# Patient Record
Sex: FEMALE | Race: WHITE | Employment: UNEMPLOYED | URBAN - METROPOLITAN AREA
[De-identification: names, ages, dates, MRNs, and addresses within clinical notes are randomized per-mention and may not be internally consistent; named-entity substitution may affect disease eponyms.]

---
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
## 2023-11-02 NOTE — PAYOR COMM NOTE
--------------  DISCHARGE REVIEW    Payor: Clayton Berg #:  [id number]  Authorization Number: [id number]    Admit date: 8/20/19  Admit time:  1409  Discharge Date: 8/21/2019  3:00 PM     Admitting Physician: Zeb Hook weeks.     Complications: None    Consultants     Provider Role Specialty    Edd Lopez MD Consulting Physician  PEDIATRICS    Graciela Francis MD Consulting Physician  Maternal Fetal Medicine              Discharge Plan:   Discharge Condition: Stable done

## 2024-03-05 ENCOUNTER — TELEPHONE (OUTPATIENT)
Dept: OBGYN CLINIC | Facility: CLINIC | Age: 24
End: 2024-03-05

## 2024-03-05 ENCOUNTER — LAB ENCOUNTER (OUTPATIENT)
Dept: LAB | Age: 24
End: 2024-03-05
Attending: FAMILY MEDICINE
Payer: MEDICAID

## 2024-03-05 ENCOUNTER — OFFICE VISIT (OUTPATIENT)
Dept: FAMILY MEDICINE CLINIC | Facility: CLINIC | Age: 24
End: 2024-03-05

## 2024-03-05 VITALS
HEIGHT: 60 IN | WEIGHT: 114 LBS | SYSTOLIC BLOOD PRESSURE: 103 MMHG | RESPIRATION RATE: 18 BRPM | BODY MASS INDEX: 22.38 KG/M2 | DIASTOLIC BLOOD PRESSURE: 68 MMHG | HEART RATE: 84 BPM

## 2024-03-05 DIAGNOSIS — O09.30 LATE PRENATAL CARE (HCC): ICD-10-CM

## 2024-03-05 DIAGNOSIS — O09.299 HISTORY OF HELLP SYNDROME, CURRENTLY PREGNANT (HCC): ICD-10-CM

## 2024-03-05 DIAGNOSIS — O09.92 HIGH-RISK PREGNANCY IN SECOND TRIMESTER (HCC): Primary | ICD-10-CM

## 2024-03-05 DIAGNOSIS — R10.12 LEFT UPPER QUADRANT ABDOMINAL PAIN: ICD-10-CM

## 2024-03-05 DIAGNOSIS — O21.9 NAUSEA AND VOMITING DURING PREGNANCY (HCC): ICD-10-CM

## 2024-03-05 LAB
ALBUMIN SERPL-MCNC: 4.5 G/DL (ref 3.2–4.8)
ALBUMIN/GLOB SERPL: 1.7 {RATIO} (ref 1–2)
ALP LIVER SERPL-CCNC: 49 U/L
ALT SERPL-CCNC: 10 U/L
ANION GAP SERPL CALC-SCNC: 7 MMOL/L (ref 0–18)
AST SERPL-CCNC: 15 U/L (ref ?–34)
BASOPHILS # BLD AUTO: 0.05 X10(3) UL (ref 0–0.2)
BASOPHILS NFR BLD AUTO: 0.6 %
BILIRUB SERPL-MCNC: 0.7 MG/DL (ref 0.3–1.2)
BUN BLD-MCNC: 10 MG/DL (ref 9–23)
BUN/CREAT SERPL: 17.9 (ref 10–20)
CALCIUM BLD-MCNC: 9.6 MG/DL (ref 8.7–10.4)
CHLORIDE SERPL-SCNC: 107 MMOL/L (ref 98–112)
CO2 SERPL-SCNC: 26 MMOL/L (ref 21–32)
CONTROL LINE PRESENT WITH A CLEAR BACKGROUND (YES/NO): YES YES/NO
CREAT BLD-MCNC: 0.56 MG/DL
DEPRECATED RDW RBC AUTO: 39.9 FL (ref 35.1–46.3)
EGFRCR SERPLBLD CKD-EPI 2021: 132 ML/MIN/1.73M2 (ref 60–?)
EOSINOPHIL # BLD AUTO: 0.03 X10(3) UL (ref 0–0.7)
EOSINOPHIL NFR BLD AUTO: 0.4 %
ERYTHROCYTE [DISTWIDTH] IN BLOOD BY AUTOMATED COUNT: 12.4 % (ref 11–15)
FASTING STATUS PATIENT QL REPORTED: NO
GLOBULIN PLAS-MCNC: 2.7 G/DL (ref 2.8–4.4)
GLUCOSE BLD-MCNC: 65 MG/DL (ref 70–99)
HCT VFR BLD AUTO: 44.7 %
HGB BLD-MCNC: 14.9 G/DL
IMM GRANULOCYTES # BLD AUTO: 0.03 X10(3) UL (ref 0–1)
IMM GRANULOCYTES NFR BLD: 0.4 %
KIT LOT #: NORMAL NUMERIC
LYMPHOCYTES # BLD AUTO: 1.11 X10(3) UL (ref 1–4)
LYMPHOCYTES NFR BLD AUTO: 13.8 %
MCH RBC QN AUTO: 29.2 PG (ref 26–34)
MCHC RBC AUTO-ENTMCNC: 33.3 G/DL (ref 31–37)
MCV RBC AUTO: 87.6 FL
MONOCYTES # BLD AUTO: 0.46 X10(3) UL (ref 0.1–1)
MONOCYTES NFR BLD AUTO: 5.7 %
NEUTROPHILS # BLD AUTO: 6.39 X10 (3) UL (ref 1.5–7.7)
NEUTROPHILS # BLD AUTO: 6.39 X10(3) UL (ref 1.5–7.7)
NEUTROPHILS NFR BLD AUTO: 79.1 %
OSMOLALITY SERPL CALC.SUM OF ELEC: 287 MOSM/KG (ref 275–295)
PLATELET # BLD AUTO: 211 10(3)UL (ref 150–450)
POTASSIUM SERPL-SCNC: 4.1 MMOL/L (ref 3.5–5.1)
PREGNANCY TEST, URINE: POSITIVE
PROT SERPL-MCNC: 7.2 G/DL (ref 5.7–8.2)
RBC # BLD AUTO: 5.1 X10(6)UL
SODIUM SERPL-SCNC: 140 MMOL/L (ref 136–145)
WBC # BLD AUTO: 8.1 X10(3) UL (ref 4–11)

## 2024-03-05 PROCEDURE — 99204 OFFICE O/P NEW MOD 45 MIN: CPT | Performed by: FAMILY MEDICINE

## 2024-03-05 PROCEDURE — 80053 COMPREHEN METABOLIC PANEL: CPT

## 2024-03-05 PROCEDURE — 85025 COMPLETE CBC W/AUTO DIFF WBC: CPT

## 2024-03-05 PROCEDURE — 81025 URINE PREGNANCY TEST: CPT | Performed by: FAMILY MEDICINE

## 2024-03-05 PROCEDURE — 36415 COLL VENOUS BLD VENIPUNCTURE: CPT

## 2024-03-05 RX ORDER — PYRIDOXINE HCL (VITAMIN B6) 25 MG
25 TABLET ORAL DAILY
Qty: 30 TABLET | Refills: 0 | Status: SHIPPED | OUTPATIENT
Start: 2024-03-05

## 2024-03-05 NOTE — TELEPHONE ENCOUNTER
Patient verified name and     LMP 12/10/2023. Denies hx of miscarriage. Hx of regular cycles. Nurse education scheduled for 3/11/24. Aware of scheduling details.

## 2024-03-05 NOTE — PROGRESS NOTES
Nicky Mcleod is a 23 year old female.  HPI:   Patient is here to establish care, patient has prior history of help syndrome at the end of her prior pregnancy in 2019, patient reports regular menses, last menstrual period was on 12/10/2023, she reports that she has been having nausea and vomiting.  She also report that she has been experiencing left upper quadrant pain, that is persistent.    Current Outpatient Medications   Medication Sig Dispense Refill    Pyridoxine HCl 25 MG Oral Tab Take 1 tablet (25 mg total) by mouth daily. 30 tablet 0    docusate sodium 100 MG Oral Cap Take 100 mg by mouth 2 (two) times daily. (Patient not taking: Reported on 3/5/2024) 30 capsule 0    ibuprofen 600 MG Oral Tab Take 1 tablet (600 mg total) by mouth every 6 (six) hours as needed. (Patient not taking: Reported on 3/5/2024) 30 tablet 0    PRENATAL 27-0.8 MG Oral Tab Take 1 tablet by mouth daily. (Patient not taking: Reported on 3/5/2024)        Past Medical History:   Diagnosis Date    Amenorrhea     irregular      Social History:  Social History     Socioeconomic History    Marital status:      Spouse name: Uriah    Number of children: 0    Years of education: 12    Highest education level: High school graduate   Occupational History    Occupation: unemployed   Tobacco Use    Smoking status: Never    Smokeless tobacco: Never   Substance and Sexual Activity    Alcohol use: Never    Drug use: Never   Other Topics Concern    Caffeine Concern No    Exercise No    Seat Belt Yes    Special Diet No    Stress Concern No     Service No    Hobby Hazards No          EXAM:   /68   Pulse 84   Resp 18   Ht 5' (1.524 m)   Wt 114 lb (51.7 kg)   LMP 12/10/2023 (Exact Date)   BMI 22.26 kg/m²     Physical Exam  Vitals and nursing note reviewed.   Constitutional:       General: She is not in acute distress.  Pulmonary:      Effort: Pulmonary effort is normal.   Abdominal:      Tenderness: There is abdominal  tenderness in the left upper quadrant. There is no guarding.   Neurological:      Mental Status: She is alert and oriented to person, place, and time.   Psychiatric:         Mood and Affect: Mood normal.         Behavior: Behavior normal.            ASSESSMENT AND PLAN:   Patient with prior history of help syndrome that required induction of labor, positive pregnancy test in clinic, patient is presenting with normal vital signs but presenting with left upper quadrant pain, she is currently 12 weeks and 2 days of gestational age with an estimated due date of September 15, 2024.  Referral establish care with obstetrics provided, imaging as noted below, blood work to be completed as well    1. High-risk pregnancy in second trimester ([facility])    - POC Urine pregnancy test [17728]  - [facility] Referral - In Network    2. Late prenatal care ([facility])    - [facility] Referral - In Network    3. History of HELLP syndrome, currently pregnant ([facility])    - [facility] Referral - In Network  - CBC W Differential W Platelet [E]; Future  - Comp Metabolic Panel (14) [E]; Future    4. Left upper quadrant abdominal pain    - US ABDOMEN COMPLETE (CPT=76700); Future    5. Nausea and vomiting during pregnancy ([facility])    - Pyridoxine HCl 25 MG Oral Tab; Take 1 tablet (25 mg total) by mouth daily.  Dispense: 30 tablet; Refill: 0       The patient indicates understanding of these issues and agrees to the plan.      The above note was creating using Dragon speech recognition technology. Please excuse any typos.

## 2024-03-06 ENCOUNTER — TELEPHONE (OUTPATIENT)
Dept: FAMILY MEDICINE CLINIC | Facility: CLINIC | Age: 24
End: 2024-03-06

## 2024-03-06 NOTE — TELEPHONE ENCOUNTER
Left message for patient to call back.   Please see provider note below.    Ext:[phone] today only

## 2024-03-06 NOTE — TELEPHONE ENCOUNTER
----- Message from Mary Graf MD sent at 3/6/2024 12:28 PM CST -----  Results reviewed. Tests show no significant abnormalities. Please inform patient.

## 2024-03-06 NOTE — TELEPHONE ENCOUNTER
Patient verified name and     Requesting to move Nurse ED for today or tomorrow. Scheduled 3/7. Aware of scheduling details.

## 2024-03-07 ENCOUNTER — NURSE ONLY (OUTPATIENT)
Dept: OBGYN CLINIC | Facility: CLINIC | Age: 24
End: 2024-03-07

## 2024-03-07 DIAGNOSIS — Z34.81 ENCOUNTER FOR SUPERVISION OF OTHER NORMAL PREGNANCY IN FIRST TRIMESTER (HCC): Primary | ICD-10-CM

## 2024-03-07 DIAGNOSIS — O21.9 NAUSEA AND VOMITING DURING PREGNANCY (HCC): ICD-10-CM

## 2024-03-07 DIAGNOSIS — O09.299: ICD-10-CM

## 2024-03-07 RX ORDER — CHOLECALCIFEROL (VITAMIN D3) 25 MCG
1 TABLET,CHEWABLE ORAL DAILY
COMMUNITY
End: 2024-03-07

## 2024-03-07 RX ORDER — CHOLECALCIFEROL (VITAMIN D3) 25 MCG
1 TABLET,CHEWABLE ORAL DAILY
Qty: 30 CAPSULE | Refills: 0 | Status: SHIPPED | OUTPATIENT
Start: 2024-03-07

## 2024-03-07 RX ORDER — MULTIVIT-MIN/IRON/FOLIC ACID/K 18-600-40
1 CAPSULE ORAL DAILY
Qty: 30 CAPSULE | Refills: 5 | Status: SHIPPED | OUTPATIENT
Start: 2024-03-07 | End: 2024-09-03

## 2024-03-07 RX ORDER — PYRIDOXINE HCL (VITAMIN B6) 25 MG
25 TABLET ORAL DAILY
Qty: 30 TABLET | Refills: 0 | OUTPATIENT
Start: 2024-03-07

## 2024-03-07 RX ORDER — FERROUS SULFATE 325(65) MG
325 TABLET ORAL
Qty: 30 TABLET | Refills: 5 | Status: SHIPPED | OUTPATIENT
Start: 2024-03-07

## 2024-03-07 NOTE — PROGRESS NOTES
Maldivian phone ine  #[phone] used to translate phone call.    Pt called today for RN OB Education.     LMP: 12/10/2023. By this date, pt's EDC is-09/15/2024        Pre  BMI: 22.26    EPDS score: 0    Working JEAN-PAUL: 09/15/2024    Hx of genetic abnormality in family: no    SDOH Screening:WNL     Educational material reviewed with patient: Prenatal care, nutrition, weight gain recommendations, travel, exercise, intercourse, pregnancy changes, safe medications, pregnancy and work, fetal movement, labor and  labor, warning signs, food safety, tdap, cord blood, breastfeeding, circumcision, and Group B strep.     Blood transfusion if needed: yes    PN labs: ordered    ASA Therapy (2 tablets,inform pt to start at 12 wks not before):   Iron Supplementation (325 mg every other day):   Vitamin D (2,000 IUs daily):       Optional genetic screening labs were reviewed: Cell FreeDNA, FTS with US, Quad screen MSAFP and CF screening. Pt wants to discuss further with provider.    [facility] Media Policy: discussed    NOB apt:      JOSE apt (if applicable):

## 2024-03-07 NOTE — TELEPHONE ENCOUNTER
Pyridoxine was stored on file at [facility]. Called and talked to staff  they did receive and are getting that ready for her.

## 2024-03-07 NOTE — TELEPHONE ENCOUNTER
Patient calling, with , to ask about status of refills of her:    Pyridoxine HCl 25 MG Oral Tab    And     prenatal vitamin with DHA 27-0.8-228 MG Oral Cap     States she had contacted her pharmacy - [facility] in [place], and they said they did not have her refills.    Please let patient know when refills have been sent

## 2024-03-07 NOTE — TELEPHONE ENCOUNTER
Refill passed per [facility] protocol.    Medication is listed as patient reported.     Requested Prescriptions   Pending Prescriptions Disp Refills    prenatal vitamin with DHA 27-0.8-228 MG Oral Cap 30 capsule 0     Sig: Take 1 capsule by mouth daily.       Prenatal Vitamins Passed - 3/7/2024  2:08 PM        Passed - Patient is either breastfeeding and has had a post partum visit in the past 12 months or patient has an open pregnancy record     No linked episodes   Patient is currently breastfeeding : Yes             Refused Prescriptions Disp Refills    Pyridoxine HCl 25 MG Oral Tab 30 tablet 0     Sig: Take 1 tablet (25 mg total) by mouth daily.       There is no refill protocol information for this order        Future Appointments         Provider Department Appt Notes    In 5 days Sarah Catalan PA-C [facility] - OB/GYN 12 week visit. Education done on 03/07/24.    In 2 weeks [address] Ultrasound           Recent Outpatient Visits              Today Encounter for supervision of other normal pregnancy in first trimester ([facility])    [facility] - OB/GYN    Nurse Only    2 days ago High-risk pregnancy in second trimester ([facility])    [facility] [address] [place] Mary Stahl MD    Office Visit    4 years ago Postpartum care and examination ([facility]    Office Visit    4 years ago Postpartum care and examination ([facility])    [facility] Rashmi [facility] APN    Postpartum    4 years ago Abdominal pain, left lower quadrant    [facility] Megha Austin CNM    Routine Prenatal

## 2024-03-11 ENCOUNTER — LAB ENCOUNTER (OUTPATIENT)
Dept: LAB | Age: 24
End: 2024-03-11
Attending: STUDENT IN AN ORGANIZED HEALTH CARE EDUCATION/TRAINING PROGRAM
Payer: MEDICAID

## 2024-03-11 DIAGNOSIS — Z34.81 ENCOUNTER FOR SUPERVISION OF OTHER NORMAL PREGNANCY IN FIRST TRIMESTER (HCC): ICD-10-CM

## 2024-03-11 DIAGNOSIS — O09.299: ICD-10-CM

## 2024-03-11 LAB
ALBUMIN SERPL-MCNC: 4.4 G/DL (ref 3.2–4.8)
ALBUMIN/GLOB SERPL: 1.8 {RATIO} (ref 1–2)
ALP LIVER SERPL-CCNC: 48 U/L
ALT SERPL-CCNC: <7 U/L
ANION GAP SERPL CALC-SCNC: 8 MMOL/L (ref 0–18)
ANTIBODY SCREEN: NEGATIVE
AST SERPL-CCNC: 14 U/L (ref ?–34)
BASOPHILS # BLD AUTO: 0.04 X10(3) UL (ref 0–0.2)
BASOPHILS NFR BLD AUTO: 0.7 %
BILIRUB SERPL-MCNC: 0.9 MG/DL (ref 0.3–1.2)
BUN BLD-MCNC: 8 MG/DL (ref 9–23)
BUN/CREAT SERPL: 14.8 (ref 10–20)
CALCIUM BLD-MCNC: 9.7 MG/DL (ref 8.7–10.4)
CHLORIDE SERPL-SCNC: 108 MMOL/L (ref 98–112)
CO2 SERPL-SCNC: 24 MMOL/L (ref 21–32)
CREAT BLD-MCNC: 0.54 MG/DL
CREAT UR-SCNC: 233.7 MG/DL
DEPRECATED RDW RBC AUTO: 39.1 FL (ref 35.1–46.3)
EGFRCR SERPLBLD CKD-EPI 2021: 133 ML/MIN/1.73M2 (ref 60–?)
EOSINOPHIL # BLD AUTO: 0.02 X10(3) UL (ref 0–0.7)
EOSINOPHIL NFR BLD AUTO: 0.4 %
ERYTHROCYTE [DISTWIDTH] IN BLOOD BY AUTOMATED COUNT: 12.2 % (ref 11–15)
FASTING STATUS PATIENT QL REPORTED: YES
GLOBULIN PLAS-MCNC: 2.5 G/DL (ref 2.8–4.4)
GLUCOSE 1H P GLC SERPL-MCNC: 65 MG/DL
GLUCOSE BLD-MCNC: 77 MG/DL (ref 70–99)
HBV SURFACE AG SER-ACNC: 0.17 [IU]/L
HBV SURFACE AG SERPL QL IA: NONREACTIVE
HCT VFR BLD AUTO: 41 %
HCV AB SERPL QL IA: NONREACTIVE
HGB BLD-MCNC: 14.3 G/DL
IMM GRANULOCYTES # BLD AUTO: 0.01 X10(3) UL (ref 0–1)
IMM GRANULOCYTES NFR BLD: 0.2 %
LYMPHOCYTES # BLD AUTO: 1.17 X10(3) UL (ref 1–4)
LYMPHOCYTES NFR BLD AUTO: 21 %
MCH RBC QN AUTO: 30.4 PG (ref 26–34)
MCHC RBC AUTO-ENTMCNC: 34.9 G/DL (ref 31–37)
MCV RBC AUTO: 87 FL
MONOCYTES # BLD AUTO: 0.26 X10(3) UL (ref 0.1–1)
MONOCYTES NFR BLD AUTO: 4.7 %
NEUTROPHILS # BLD AUTO: 4.06 X10 (3) UL (ref 1.5–7.7)
NEUTROPHILS # BLD AUTO: 4.06 X10(3) UL (ref 1.5–7.7)
NEUTROPHILS NFR BLD AUTO: 73 %
OSMOLALITY SERPL CALC.SUM OF ELEC: 287 MOSM/KG (ref 275–295)
PLATELET # BLD AUTO: 197 10(3)UL (ref 150–450)
POTASSIUM SERPL-SCNC: 4.1 MMOL/L (ref 3.5–5.1)
PROT SERPL-MCNC: 6.9 G/DL (ref 5.7–8.2)
PROT UR-MCNC: 27.3 MG/DL (ref ?–14)
PROT/CREAT UR-RTO: 0.12
RBC # BLD AUTO: 4.71 X10(6)UL
RH BLOOD TYPE: POSITIVE
RUBV IGG SER QL: POSITIVE
RUBV IGG SER-ACNC: 223.4 IU/ML (ref 10–?)
SODIUM SERPL-SCNC: 140 MMOL/L (ref 136–145)
T PALLIDUM AB SER QL IA: NONREACTIVE
WBC # BLD AUTO: 5.6 X10(3) UL (ref 4–11)

## 2024-03-11 PROCEDURE — 87389 HIV-1 AG W/HIV-1&-2 AB AG IA: CPT

## 2024-03-11 PROCEDURE — 82570 ASSAY OF URINE CREATININE: CPT

## 2024-03-11 PROCEDURE — 85025 COMPLETE CBC W/AUTO DIFF WBC: CPT

## 2024-03-11 PROCEDURE — 86803 HEPATITIS C AB TEST: CPT

## 2024-03-11 PROCEDURE — 80053 COMPREHEN METABOLIC PANEL: CPT

## 2024-03-11 PROCEDURE — 87340 HEPATITIS B SURFACE AG IA: CPT

## 2024-03-11 PROCEDURE — 82950 GLUCOSE TEST: CPT

## 2024-03-11 PROCEDURE — 36415 COLL VENOUS BLD VENIPUNCTURE: CPT

## 2024-03-11 PROCEDURE — 86850 RBC ANTIBODY SCREEN: CPT

## 2024-03-11 PROCEDURE — 86762 RUBELLA ANTIBODY: CPT

## 2024-03-11 PROCEDURE — 86780 TREPONEMA PALLIDUM: CPT

## 2024-03-11 PROCEDURE — 84156 ASSAY OF PROTEIN URINE: CPT

## 2024-03-11 PROCEDURE — 86901 BLOOD TYPING SEROLOGIC RH(D): CPT

## 2024-03-11 PROCEDURE — 86900 BLOOD TYPING SEROLOGIC ABO: CPT

## 2024-03-11 PROCEDURE — 87086 URINE CULTURE/COLONY COUNT: CPT

## 2024-03-11 NOTE — TELEPHONE ENCOUNTER
Patient informed of results and recommendations, verbalized understanding.  (Name and  verified)

## 2024-03-12 ENCOUNTER — LAB ENCOUNTER (OUTPATIENT)
Dept: LAB | Age: 24
End: 2024-03-12
Attending: STUDENT IN AN ORGANIZED HEALTH CARE EDUCATION/TRAINING PROGRAM
Payer: MEDICAID

## 2024-03-12 ENCOUNTER — INITIAL PRENATAL (OUTPATIENT)
Dept: OBGYN CLINIC | Facility: CLINIC | Age: 24
End: 2024-03-12

## 2024-03-12 VITALS
DIASTOLIC BLOOD PRESSURE: 75 MMHG | SYSTOLIC BLOOD PRESSURE: 112 MMHG | WEIGHT: 114 LBS | BODY MASS INDEX: 22 KG/M2 | HEART RATE: 71 BPM

## 2024-03-12 DIAGNOSIS — Z34.81 ENCOUNTER FOR SUPERVISION OF OTHER NORMAL PREGNANCY IN FIRST TRIMESTER (HCC): Primary | ICD-10-CM

## 2024-03-12 DIAGNOSIS — Z34.81 ENCOUNTER FOR SUPERVISION OF OTHER NORMAL PREGNANCY IN FIRST TRIMESTER (HCC): ICD-10-CM

## 2024-03-12 PROCEDURE — 81329 SMN1 GENE DOS/DELETION ALYS: CPT

## 2024-03-12 PROCEDURE — 99203 OFFICE O/P NEW LOW 30 MIN: CPT | Performed by: STUDENT IN AN ORGANIZED HEALTH CARE EDUCATION/TRAINING PROGRAM

## 2024-03-12 PROCEDURE — 36415 COLL VENOUS BLD VENIPUNCTURE: CPT

## 2024-03-12 NOTE — PROGRESS NOTES
[facility]  Obstetrics and Gynecology  Prenatal Visit  Sarah Catalan PA-C    HPI   Nicky Mcleod is a 23 year old.o.  13w2d weeks.    Presenting for her routine prenatal visit.   OB History     OB History    Para Term  AB Living   2 1 1 0 0 1   SAB IAB Ectopic Multiple Live Births   0 0 0 0 1      # Outcome Date GA Lbr Grayson/2nd Weight Sex Delivery Anes PTL Lv   2 Current            1 Term 19 37w1d 03:40 / 01:20 6 lb 14.2 oz (3.125 kg) F NORMAL SPONT None N ELENA      Birth Comments: Mother's age: 18  Maternal Blood Type: O Positive  : 1  Para: 1  Hearing Test:   Bili T: 8.80      Complications: Other - see comments     Medications     Current Outpatient Medications   Medication Sig Dispense Refill    prenatal vitamin with DHA 27-0.8-228 MG Oral Cap Take 1 capsule by mouth daily. 30 capsule 0    Ferrous Sulfate 325 (65 Fe) MG Oral Tab Take 1 tablet (325 mg total) by mouth daily with breakfast. 30 tablet 5    Aspirin 81 MG Oral Cap Take 2 tablets by mouth daily. Begin taking at 12 weeks 60 capsule 5    Cholecalciferol (VITAMIN D) 50 MCG (2000 UT) Oral Cap Take 1 capsule (2,000 Units total) by mouth daily. 30 capsule 5    Pyridoxine HCl 25 MG Oral Tab Take 1 tablet (25 mg total) by mouth daily. (Patient not taking: Reported on 3/12/2024) 30 tablet 0     Exam   /75   Pulse 71   Wt 114 lb (51.7 kg)   LMP 12/10/2023 (Exact Date)   BMI 22.26 kg/m²   FH:   FHTs: 155  Assessment   Nicky is a 23 year old female  with viable IUP at 13w2d weeks.      ICD-10-CM    1. Encounter for supervision of other normal pregnancy in first trimester (HCC)  Z34.81 Cystic Fibrosis (CF) Full-gene Carrier Screen     Spinal Muscular Atrophy (SMA)     [id number] PLUS+SCA        Plan   - ultrasound scheduled for 3/22  - 1st trimester labs reviewed, WNL  - Genetic screening ordered.  - Upon reading patient chart, patient incorrectly scheduled, next appointment will need to be her  \"initial OB'\" visit    CHIQUITA LLAMAS PA-C  10:33 AM  3/12/2024

## 2024-03-17 LAB
GESTATIONAL AGE > OR = 9W:: YES
MONOSOMY X (TURNER SYNDROME): NOT DETECTED
TEST RESULT: NEGATIVE
TRISOMY 13 (PATAU SYNDROME): NEGATIVE
TRISOMY 18 (EDWARDS SYNDROME): NEGATIVE
TRISOMY 21 (DOWN SYNDROME): NEGATIVE
XXX (TRIPLE X SYNDROME): NOT DETECTED
XXY (KLINEFELTER SYNDROME): NOT DETECTED
XYY (JACOBS SYNDROME): NOT DETECTED

## 2024-03-22 ENCOUNTER — HOSPITAL ENCOUNTER (OUTPATIENT)
Dept: ULTRASOUND IMAGING | Facility: HOSPITAL | Age: 24
Discharge: HOME | End: 2024-03-22
Attending: FAMILY MEDICINE
Payer: MEDICAID

## 2024-03-22 DIAGNOSIS — R10.12 LEFT UPPER QUADRANT ABDOMINAL PAIN: ICD-10-CM

## 2024-03-22 PROCEDURE — 76700 US EXAM ABDOM COMPLETE: CPT | Performed by: FAMILY MEDICINE

## 2024-03-25 ENCOUNTER — TELEPHONE (OUTPATIENT)
Dept: FAMILY MEDICINE CLINIC | Facility: CLINIC | Age: 24
End: 2024-03-25

## 2024-03-25 NOTE — TELEPHONE ENCOUNTER
With Language Line  Christos   ID # [id number]    Patient calling ( identified name and  ) states received a message concerning her US of abdomen , she currently  has nausea,  vomiting  and pain in the right side, usually in the morning   She is currently pregnant     Advised to be seen at the ER( as directed below )   Patient will go to [place]  ER today      Mary Graf MD  3/22/2024  2:10 PM CDT       Results reviewed. Please inform patient ultrasound was nonspecific, it does show that there is something noted in the gallbladder that it may be either a polyp or a gallstone, this could be the cause of her pain, it is important for her to be aware of signs of cholecystitis, if she develops significant right upper quadrant pain, nausea vomiting, she should be seen in the ER immediately, please instruct patient to avoid fried food as well as fatty food.  Postpartum recommend to complete imaging again and from there decide if she would need surgical intervention to remove her gallbladder.

## 2024-03-26 ENCOUNTER — APPOINTMENT (OUTPATIENT)
Dept: MRI IMAGING | Facility: HOSPITAL | Age: 24
End: 2024-03-26
Attending: EMERGENCY MEDICINE
Payer: MEDICAID

## 2024-03-26 ENCOUNTER — HOSPITAL ENCOUNTER (EMERGENCY)
Facility: HOSPITAL | Age: 24
Discharge: HOME | End: 2024-03-26
Attending: EMERGENCY MEDICINE
Payer: MEDICAID

## 2024-03-26 VITALS
OXYGEN SATURATION: 98 % | RESPIRATION RATE: 18 BRPM | TEMPERATURE: 98 F | HEART RATE: 65 BPM | SYSTOLIC BLOOD PRESSURE: 103 MMHG | DIASTOLIC BLOOD PRESSURE: 66 MMHG

## 2024-03-26 DIAGNOSIS — O21.9 NAUSEA AND VOMITING IN PREGNANCY (HCC): Primary | ICD-10-CM

## 2024-03-26 LAB
ALBUMIN SERPL-MCNC: 4.1 G/DL (ref 3.2–4.8)
ALBUMIN/GLOB SERPL: 2 {RATIO} (ref 1–2)
ALP LIVER SERPL-CCNC: 48 U/L
ALT SERPL-CCNC: 7 U/L
ANION GAP SERPL CALC-SCNC: 7 MMOL/L (ref 0–18)
AST SERPL-CCNC: 9 U/L (ref ?–34)
B-HCG UR QL: POSITIVE
BASOPHILS # BLD AUTO: 0.04 X10(3) UL (ref 0–0.2)
BASOPHILS NFR BLD AUTO: 0.5 %
BILIRUB SERPL-MCNC: 0.4 MG/DL (ref 0.3–1.2)
BUN BLD-MCNC: 11 MG/DL (ref 9–23)
BUN/CREAT SERPL: 21.6 (ref 10–20)
CALCIUM BLD-MCNC: 9.2 MG/DL (ref 8.7–10.4)
CHLORIDE SERPL-SCNC: 110 MMOL/L (ref 98–112)
CO2 SERPL-SCNC: 23 MMOL/L (ref 21–32)
CREAT BLD-MCNC: 0.51 MG/DL
DEPRECATED RDW RBC AUTO: 38.6 FL (ref 35.1–46.3)
EGFRCR SERPLBLD CKD-EPI 2021: 135 ML/MIN/1.73M2 (ref 60–?)
EOSINOPHIL # BLD AUTO: 0.05 X10(3) UL (ref 0–0.7)
EOSINOPHIL NFR BLD AUTO: 0.7 %
ERYTHROCYTE [DISTWIDTH] IN BLOOD BY AUTOMATED COUNT: 12.7 % (ref 11–15)
GLOBULIN PLAS-MCNC: 2.1 G/DL (ref 2.8–4.4)
GLUCOSE BLD-MCNC: 82 MG/DL (ref 70–99)
HCT VFR BLD AUTO: 36.3 %
HGB BLD-MCNC: 13.2 G/DL
IMM GRANULOCYTES # BLD AUTO: 0.02 X10(3) UL (ref 0–1)
IMM GRANULOCYTES NFR BLD: 0.3 %
LIPASE SERPL-CCNC: 35 U/L (ref 13–75)
LYMPHOCYTES # BLD AUTO: 1.43 X10(3) UL (ref 1–4)
LYMPHOCYTES NFR BLD AUTO: 19.3 %
MCH RBC QN AUTO: 30.9 PG (ref 26–34)
MCHC RBC AUTO-ENTMCNC: 36.4 G/DL (ref 31–37)
MCV RBC AUTO: 85 FL
MONOCYTES # BLD AUTO: 0.61 X10(3) UL (ref 0.1–1)
MONOCYTES NFR BLD AUTO: 8.2 %
NEUTROPHILS # BLD AUTO: 5.27 X10 (3) UL (ref 1.5–7.7)
NEUTROPHILS # BLD AUTO: 5.27 X10(3) UL (ref 1.5–7.7)
NEUTROPHILS NFR BLD AUTO: 71 %
OSMOLALITY SERPL CALC.SUM OF ELEC: 288 MOSM/KG (ref 275–295)
PLATELET # BLD AUTO: 186 10(3)UL (ref 150–450)
POTASSIUM SERPL-SCNC: 3.6 MMOL/L (ref 3.5–5.1)
PROT SERPL-MCNC: 6.2 G/DL (ref 5.7–8.2)
RBC # BLD AUTO: 4.27 X10(6)UL
SODIUM SERPL-SCNC: 140 MMOL/L (ref 136–145)
WBC # BLD AUTO: 7.4 X10(3) UL (ref 4–11)

## 2024-03-26 PROCEDURE — 99284 EMERGENCY DEPT VISIT MOD MDM: CPT

## 2024-03-26 PROCEDURE — 83690 ASSAY OF LIPASE: CPT | Performed by: EMERGENCY MEDICINE

## 2024-03-26 PROCEDURE — 85025 COMPLETE CBC W/AUTO DIFF WBC: CPT | Performed by: EMERGENCY MEDICINE

## 2024-03-26 PROCEDURE — 80053 COMPREHEN METABOLIC PANEL: CPT | Performed by: EMERGENCY MEDICINE

## 2024-03-26 PROCEDURE — 36415 COLL VENOUS BLD VENIPUNCTURE: CPT

## 2024-03-26 PROCEDURE — 72195 MRI PELVIS W/O DYE: CPT | Performed by: EMERGENCY MEDICINE

## 2024-03-26 PROCEDURE — 81025 URINE PREGNANCY TEST: CPT

## 2024-03-26 RX ORDER — ONDANSETRON 4 MG/1
4 TABLET, ORALLY DISINTEGRATING ORAL EVERY 4 HOURS PRN
Qty: 10 TABLET | Refills: 0 | Status: SHIPPED | OUTPATIENT
Start: 2024-03-26 | End: 2024-04-02

## 2024-03-26 NOTE — ED INITIAL ASSESSMENT (HPI)
Pt presents to ed with c/o LLQ pain x 3 weeks. Pt states she is having n/v in the morning, and abd pain that comes at night. pt states she was told to come to ED if she is having abd pain and n/v.  No meds PTA. Pt reports she is 13 weeks pregnant. Denies vaginal bleeding    Margaret interp # [id number]

## 2024-03-27 NOTE — DISCHARGE INSTRUCTIONS
Take Zofran as needed for nausea or vomiting.  Follow-up with your primary physician and with your OB/GYN as discussed.  Return to the emergency department if repeated vomiting, increased abdominal pain, or other new symptoms develop.

## 2024-03-27 NOTE — ED PROVIDER NOTES
Patient Seen in: [facility] Emergency Department      History     Chief Complaint   Patient presents with    Pregnancy Issues     Stated Complaint: Abdominal pain    Subjective:   HPI    23-year-old pregnant female (15-2/7 weeks by dates) presents with complaints of abdominal pain, nausea, and vomiting.  The patient reports that she has had intermittent abdominal pain, nausea, and vomiting for the past 2 months.  She has seen her primary physician regarding the symptoms and had an ultrasound done 4 days ago that showed possible gallbladder polyps versus nonmobile stones but no evidence of cholecystitis.  She was called by her primary physician and told to come to the emergency department if she has right upper quadrant abdominal pain or if nausea or vomiting worsens.  She states she had increased nausea and vomiting today.  She complains of pain to the left side of her abdomen today.  No known fevers.  She denies dysuria.  No vaginal bleeding or discharge.  She has not yet seen an OB during this pregnancy and has her first scheduled appointment next week.  -0-0-1.  No prior abdominal surgeries.  History is obtained from the patient through a language line .    Objective:   Past Medical History:   Diagnosis Date    Amenorrhea     irregular              History reviewed. No pertinent surgical history.             Social History     Socioeconomic History    Marital status:      Spouse name: Uriah    Number of children: 0    Years of education: 12    Highest education level: High school graduate   Occupational History    Occupation: unemployed   Tobacco Use    Smoking status: Never    Smokeless tobacco: Never   Vaping Use    Vaping Use: Never used   Substance and Sexual Activity    Alcohol use: Never    Drug use: Never   Other Topics Concern    Caffeine Concern No    Exercise No    Seat Belt Yes    Special Diet No    Stress Concern No     Service No    Hobby Hazards No      Social Determinants of Health     Financial Resource Strain: Low Risk  (3/26/2024)    Financial Resource Strain     Difficulty of Paying Living Expenses: Not hard at all     Med Affordability: No   Food Insecurity: No Food Insecurity (3/26/2024)    Food Insecurity     Food Insecurity: Never true   Transportation Needs: No Transportation Needs (3/26/2024)    Transportation Needs     Lack of Transportation: No   Stress: No Stress Concern Present (3/26/2024)    Stress     Feeling of Stress : No   Housing Stability: Low Risk  (3/26/2024)    Housing Stability     Housing Instability: No              Review of Systems    Positive for stated complaint: Abdominal pain  Other systems are as noted in HPI.  Constitutional and vital signs reviewed.      All other systems reviewed and negative except as noted above.    Physical Exam     ED Triage Vitals [03/26/24 1819]   /77   Pulse 89   Resp 18   Temp 97.6 °F (36.4 °C)   Temp src Temporal   SpO2 99 %   O2 Device None (Room air)       Current:/74   Pulse 82   Temp 97.6 °F (36.4 °C) (Temporal)   Resp 18   LMP 12/10/2023 (Exact Date)   SpO2 99%         Physical Exam      General Appearance: alert, no distress  Eyes: pupils equal and round no pallor or injection  ENT, Mouth: mucous membranes moist  Respiratory: there are no retractions, lungs are clear to auscultation  Cardiovascular: regular rate and rhythm  Gastrointestinal:  abdomen is soft with tenderness to the right lower quadrant.  There is also some mild tenderness to the left lower quadrant, no masses, bowel sounds normal  Neurological: Speech normal.  Moving extremities x 4.  Skin: warm and dry, no rashes.  Musculoskeletal: neck is supple non tender        Extremities are symmetrical, full range of motion  Psychiatric: patient is oriented X 3, there is no agitation    DIFFERENTIAL DIAGNOSIS: After history and physical exam differential diagnosis was considered for nausea and vomiting in pregnancy,  appendicitis, urinary tract infection, or other        ED Course     Labs Reviewed   COMP METABOLIC PANEL (14) - Abnormal; Notable for the following components:       Result Value    Creatinine 0.51 (*)     BUN/CREA Ratio 21.6 (*)     ALT 7 (*)     Alkaline Phosphatase 48 (*)     Globulin  2.1 (*)     All other components within normal limits   POCT PREGNANCY URINE - Abnormal; Notable for the following components:    POCT Urine Pregnancy Positive (*)     All other components within normal limits   LIPASE - Normal   CBC WITH DIFFERENTIAL WITH PLATELET    Narrative:     The following orders were created for panel order CBC With Differential With Platelet.  Procedure                               Abnormality         Status                     ---------                               -----------         ------                     CBC W/ DIFFERENTIAL[[id number]]                              Final result                 Please view results for these tests on the individual orders.   CBC W/ DIFFERENTIAL                    MDM      Lab and MRI results noted.  No significant abnormalities found.  WBC normal.  Appendix visualized without evidence of appendicitis.  Will discharge home with antiemetics and recommendations to follow-up with her primary physician and OB/GYN.                                   Medical Decision Making      Disposition and Plan     Clinical Impression:  1. Nausea and vomiting in pregnancy (HCC)         Disposition:  Discharge  3/26/2024 11:09 pm    Follow-up:  Jenifer Gaona MD  [address] [place] [id number] [phone]    Follow up      your ob/gyn    Follow up            Medications Prescribed:  Current Discharge Medication List        START taking these medications    Details   ondansetron 4 MG Oral Tablet Dispersible Take 1 tablet (4 mg total) by mouth every 4 (four) hours as needed for Nausea.  Qty: 10 tablet, Refills: 0

## 2024-03-27 NOTE — ED QUICK NOTES
Pt to ed w/c of abd pain and back pain for approx 3 weeks. Pt also reports N/V for 3 weeks as well. The pt states that she was told to come to ed PCP if symptoms did not resolve. Pt in no apparent distress.

## 2024-04-02 ENCOUNTER — LAB ENCOUNTER (OUTPATIENT)
Dept: LAB | Facility: HOSPITAL | Age: 24
End: 2024-04-02
Attending: OBSTETRICS & GYNECOLOGY
Payer: MEDICAID

## 2024-04-02 ENCOUNTER — INITIAL PRENATAL (OUTPATIENT)
Dept: OBGYN CLINIC | Facility: CLINIC | Age: 24
End: 2024-04-02
Payer: MEDICAID

## 2024-04-02 VITALS
DIASTOLIC BLOOD PRESSURE: 73 MMHG | HEART RATE: 84 BPM | WEIGHT: 117 LBS | BODY MASS INDEX: 23 KG/M2 | SYSTOLIC BLOOD PRESSURE: 108 MMHG

## 2024-04-02 DIAGNOSIS — Z34.92 NORMAL PREGNANCY IN SECOND TRIMESTER (HCC): Primary | ICD-10-CM

## 2024-04-02 DIAGNOSIS — Z34.92 NORMAL PREGNANCY IN SECOND TRIMESTER (HCC): ICD-10-CM

## 2024-04-02 PROBLEM — Z34.90 PREGNANCY (HCC): Status: RESOLVED | Noted: 2019-08-20 | Resolved: 2024-04-02

## 2024-04-02 PROBLEM — O60.03 PRETERM LABOR IN THIRD TRIMESTER (HCC): Status: RESOLVED | Noted: 2019-08-20 | Resolved: 2024-04-02

## 2024-04-02 PROBLEM — O09.33 INSUFFICIENT PRENATAL CARE IN THIRD TRIMESTER (HCC): Status: RESOLVED | Noted: 2019-08-13 | Resolved: 2024-04-02

## 2024-04-02 PROBLEM — O14.20 HELLP SYNDROME (HCC): Status: RESOLVED | Noted: 2019-09-13 | Resolved: 2024-04-02

## 2024-04-02 PROBLEM — Z34.03 SUPERVISION OF NORMAL FIRST TEEN PREGNANCY IN THIRD TRIMESTER (HCC): Status: RESOLVED | Noted: 2019-08-13 | Resolved: 2024-04-02

## 2024-04-02 PROBLEM — D69.6 TEMPORARY LOW PLATELET COUNT (HCC): Status: RESOLVED | Noted: 2019-09-11 | Resolved: 2024-04-02

## 2024-04-02 LAB
APPEARANCE: CLEAR
BILIRUBIN: NEGATIVE
GLUCOSE (URINE DIPSTICK): NEGATIVE MG/DL
KETONES (URINE DIPSTICK): NEGATIVE MG/DL
LEUKOCYTES: NEGATIVE
MULTISTIX LOT#: NORMAL NUMERIC
NITRITE, URINE: NEGATIVE
OCCULT BLOOD: NEGATIVE
PH, URINE: 7 (ref 4.5–8)
PROTEIN (URINE DIPSTICK): NEGATIVE MG/DL
SPECIFIC GRAVITY: 1.02 (ref 1–1.03)
URINE-COLOR: YELLOW
UROBILINOGEN,SEMI-QN: 0.2 MG/DL (ref 0–1.9)

## 2024-04-02 PROCEDURE — 82105 ALPHA-FETOPROTEIN SERUM: CPT

## 2024-04-02 PROCEDURE — 0500F INITIAL PRENATAL CARE VISIT: CPT | Performed by: OBSTETRICS & GYNECOLOGY

## 2024-04-02 PROCEDURE — 81003 URINALYSIS AUTO W/O SCOPE: CPT | Performed by: OBSTETRICS & GYNECOLOGY

## 2024-04-02 PROCEDURE — 36415 COLL VENOUS BLD VENIPUNCTURE: CPT

## 2024-04-02 NOTE — PROGRESS NOTES
Nicky Mcleod is a 23 year old female  Patient's last menstrual period was 12/10/2023 (exact date).   Chief Complaint   Patient presents with    Prenatal Care     Pt presents for repeat OB visit, c/o constipation       OBSTETRICS HISTORY:     OB History    Para Term  AB Living   2 1 1 0 0 1   SAB IAB Ectopic Multiple Live Births   0 0 0 0 1      # Outcome Date GA Lbr Grayson/2nd Weight Sex Delivery Anes PTL Lv   2 Current            1 Term 19 37w1d 03:40 / :20 6 lb 14.2 oz (3.125 kg) F NORMAL SPONT None N ELENA      Birth Comments: Mother's age: 18  Maternal Blood Type: O Positive  : 1  Para: 1  Hearing Test:   Bili T: 8.80      Complications: Other - see comments       GYNE HISTORY:         No data recorded       No data to display                  MEDICAL HISTORY:     Past Medical History:   Diagnosis Date    Amenorrhea     irregular       SURGICAL HISTORY:     History reviewed. No pertinent surgical history.    SOCIAL HISTORY:     Social History     Socioeconomic History    Marital status:      Spouse name: Uriah    Number of children: 0    Years of education: 12    Highest education level: High school graduate   Occupational History    Occupation: unemployed   Tobacco Use    Smoking status: Never    Smokeless tobacco: Never   Vaping Use    Vaping Use: Never used   Substance and Sexual Activity    Alcohol use: Never    Drug use: Never   Other Topics Concern    Caffeine Concern No    Exercise No    Seat Belt Yes    Special Diet No    Stress Concern No     Service No    Hobby Hazards No     Social Determinants of Health     Financial Resource Strain: Low Risk  (3/26/2024)    Financial Resource Strain     Difficulty of Paying Living Expenses: Not hard at all     Med Affordability: No   Food Insecurity: No Food Insecurity (3/26/2024)    Food Insecurity     Food Insecurity: Never true   Transportation Needs: No Transportation Needs (3/26/2024)    Transportation  Needs     Lack of Transportation: No   Stress: No Stress Concern Present (3/26/2024)    Stress     Feeling of Stress : No   Housing Stability: Low Risk  (3/26/2024)    Housing Stability     Housing Instability: No        FAMILY HISTORY:     Family History   Problem Relation Age of Onset    Hypertension Mother     Diabetes Paternal Grandmother     Hypertension Paternal Grandmother     Other (Other) Maternal Grandmother     Cancer Neg        MEDICATIONS:       Current Outpatient Medications:     ondansetron 4 MG Oral Tablet Dispersible, Take 1 tablet (4 mg total) by mouth every 4 (four) hours as needed for Nausea., Disp: 10 tablet, Rfl: 0    prenatal vitamin with DHA 27-0.8-228 MG Oral Cap, Take 1 capsule by mouth daily., Disp: 30 capsule, Rfl: 0    Pyridoxine HCl 25 MG Oral Tab, Take 1 tablet (25 mg total) by mouth daily., Disp: 30 tablet, Rfl: 0    Ferrous Sulfate 325 (65 Fe) MG Oral Tab, Take 1 tablet (325 mg total) by mouth daily with breakfast., Disp: 30 tablet, Rfl: 5    Aspirin 81 MG Oral Cap, Take 2 tablets by mouth daily. Begin taking at 12 weeks, Disp: 60 capsule, Rfl: 5    Cholecalciferol (VITAMIN D) 50 MCG (2000 UT) Oral Cap, Take 1 capsule (2,000 Units total) by mouth daily., Disp: 30 capsule, Rfl: 5    ALLERGIES:     No Known Allergies      REVIEW OF SYSTEMS:     Constitutional:    denies fever / chills  Eyes:     denies blurred or double vision  Cardiovascular:  denies chest pain or palpitations  Respiratory:    denies shortness of breath  Gastrointestinal:  denies severe abdominal pain, frequent diarrhea or constipation, nausea / vomiting  Genitourinary:    denies dysuria, bothersome incontinence  Skin/Breast:   denies any breast pain, lumps, or discharge  Neurological:    denies frequent severe headaches  Psychiatric:   denies depression or anxiety, thoughts of harming self or others  Heme/Lymph:    denies easy bruising or bleeding      PHYSICAL EXAM:   Blood pressure 108/73, pulse 84, weight 117 lb  (53.1 kg), last menstrual period 12/10/2023, currently breastfeeding.  Constitutional:  well developed, well nourished  Head/Face:  normocephalic  Neck/Thyroid: thyroid symmetric, no thyromegaly, no nodules, no adenopathy  Lymphatic: no abnormal supraclavicular or axillary adenopathy is noted  Respiratory:      chest wall symmetric and nontender on palpation, clear to asculation bilateral, no wheezing, rales, ronchi, and resonance normal upon percussion  Cardiovascular: chest normal in appearance, regular rate and rhythm, no murmurs, PMI palpated midclavicular line  Breast:   normal without palpable masses, tenderness, asymmetry, nipple discharge, nipple retraction or skin changes  Abdomen:   soft, nontender, nondistended, no masses  Skin/Hair:  no unusual rashes or bruises  Extremities:  no edema, no cyanosis, non tender bilaterally  Psychiatric:   oriented to time, place, person and situation. Appropriate mood and affect    Pelvic Exam:  External Genitalia:  normal appearance, hair distribution, and no lesions  Urethral Meatus:   normal in size, location, without lesions   Bladder:    no fullness, masses or tenderness  Vagina:    normal appearance without lesions, no abnormal discharge  Cervix:    No lesions, normal friability   Uterus:    normal in size, 8 wk sized, normal contour, position, mobility, without  motion tenderness  Adnexa:   normal without masses or tenderness  Perineum:   normal  Anus: no hemorroids         ASSESSMENT & PLAN:     Nicky was seen today for prenatal care.    Diagnoses and all orders for this visit:    Normal pregnancy in second trimester (HCC)  -     AFP, Maternal Serum; Future  -     US PREG 2ND 3RD TRIMESTER (CPT=76805); Future  -     ThinPrep Pap with HPV Reflex, Chlamydia/GC; Future  -     Chlamydia/Gc Amplification; Future  History and physical exam have been performed.  If you ever need to reach a provider please call the office phone number.  After office hours there is always  somebody on call.  Reasons to call the provider discussed with patient.  Prenatal vitamins have been prescribed. The prenatal vitamin has iron and folic acid which helps to prevent iron deficiency anemia and neural tube defects.  Additional iron has been prescribed and should be taken every other day.  Vitamin D supplementation 9364-5848 Iunits daily can be given for pregnant patients whose children are deemed at high risk of asthma. (Patient or her  has asthma).  Vitamin B6 can be prescribed if there is nausea or vomiting and is safe to use up to 3 times daily.  Antacids for reflux are safe.  Tylenol Cold daytime or Tylenol flu daytime are safe to use if there are upper respiratory symptoms.  Extra strength Tylenol can be used for persistent headaches and back pain but in a limited fashion.  Avoidance of nonsteroidals discussed with the patient.  A healthy diet is important and dietary counseling done with the patient..  I counseled that fruits, vegetables, legumes, fish, lean meats, chicken, turkey, nuts and seeds are advised.  Fish to avoid are Miki Mackerel, Ono, Orange roughy, Shark, Swordfish, Tilefish, Bigeye tuna. Canned light tuna (including skipjack) is a good choice.  Please avoid fried and greasy foods.  Please avoid caffeine, alcohol, THC.  I recommend calcium supplementation 500 mg daily and Vitamin D 1,000 mg daily that the patient can obtain over the counter.  Exercise is encouraged and is okay for 30 minutes daily 5 to 7 days/week.  Moderate intensity exercise (able to carry on a normal conversation during exercise) is advised.  Strength training is allowed in a safe manner as to not cause back injury.  Sexual intercourse is allowed if there is no vaginal bleeding . Hot tubs and saunas should be avoided during the first trimester.  Swimming is okay to participate.  The patient should be up-to-date regarding COVID-19 vaccination and the influenza vaccine is recommended during influenza  season.   Pregnancy risk factors were reviewed with the patient and prenatal visit frequency and potential timing of future ultrasounds and fetal monitoring if needed were discussed with the patient.  I reviewed the above with the patient and spent approx 32 minutes face to face consultation.       Hx hellp syndrome- cont asa 2 taBs daily    FOLLOW-UP     Return in about 4 weeks (around 4/30/2024) for prenatal visit.      Negrito Cm MD  4/2/2024

## 2024-04-03 LAB
C TRACH DNA SPEC QL NAA+PROBE: NEGATIVE
N GONORRHOEA DNA SPEC QL NAA+PROBE: NEGATIVE

## 2024-04-04 LAB
AFP MOM: 1.1
AFP VALUE: 45.6 NG/ML
GA ON COLL DATE: 16.3 WEEKS
INSULIN DEP AFP: NO
MAT AGE AT EDD: 23.3 YR
MULTIPLE GEST AFP: NO
OSBR RISK 1 IN AFP: 8933
WEIGHT AFP: 117 LBS

## 2024-04-04 NOTE — TELEPHONE ENCOUNTER
Refill passed per [facility] protocol.    Last fill was for 30 days    Would you like increase the amount?   Medication pended for your review / approval for 90 days       Requested Prescriptions   Pending Prescriptions Disp Refills    prenatal vitamin with DHA 27-0.8-228 MG Oral Cap 30 capsule 0     Sig: Take 1 capsule by mouth daily.       Prenatal Vitamins Passed - 4/4/2024  2:26 PM        Passed - Patient is either breastfeeding and has had a post partum visit in the past 12 months or patient has an open pregnancy record     No linked episodes   Patient is currently breastfeeding : Yes               Future Appointments         Provider Department Appt Notes    In 2 weeks [address] Ultrasound - [place]     In 3 weeks Woody Gamez MD [facility] - OB/GYN pn          Recent Outpatient Visits              2 days ago Normal pregnancy in second trimester ([facility])    [facility] - OB/GYN Negrito Cm MD    Initial Prenatal    3 weeks ago Encounter for supervision of other normal pregnancy in first trimester ([facility])    [facility] - OB/GYN Sarah Catalan PA-C    Initial Prenatal    4 weeks ago Encounter for supervision of other normal pregnancy in first trimester ([facility])    [facility] - OB/GYN    Nurse Only    1 month ago High-risk pregnancy in second trimester ([facility])    [facility] [address], [place] ArizMary Coles MD    Office Visit    4 years ago Postpartum care and examination ([facility])    [facility] - Midwifery Urvashi Telles CNM    Office Visit

## 2024-04-05 RX ORDER — CHOLECALCIFEROL (VITAMIN D3) 25 MCG
1 TABLET,CHEWABLE ORAL DAILY
Qty: 90 CAPSULE | Refills: 0 | Status: SHIPPED | OUTPATIENT
Start: 2024-04-05

## 2024-04-16 ENCOUNTER — TELEPHONE (OUTPATIENT)
Dept: OBGYN CLINIC | Facility: CLINIC | Age: 24
End: 2024-04-16

## 2024-04-16 NOTE — TELEPHONE ENCOUNTER
Patient verified name and     Patient informed that she should continue taking Aspirin until otherwise discussed with provider. Informed patient Rx was sent with 5 refills, she would just need to contact pharmacy to dispense more. Informed patient of other alternatives for iron. Herzio message sent.

## 2024-04-16 NOTE — TELEPHONE ENCOUNTER
Pt called, would like to know if Aspirin 81 MG Oral Cap  should continue to be taken since all medication is done. PT mentioned iron medication makes PT nauseated and seeking recommendations. Please call.

## 2024-04-23 ENCOUNTER — HOSPITAL ENCOUNTER (OUTPATIENT)
Dept: ULTRASOUND IMAGING | Age: 24
Discharge: HOME | End: 2024-04-23
Attending: OBSTETRICS & GYNECOLOGY
Payer: MEDICAID

## 2024-04-23 DIAGNOSIS — Z34.92 NORMAL PREGNANCY IN SECOND TRIMESTER (HCC): ICD-10-CM

## 2024-04-23 PROCEDURE — 76805 OB US >/= 14 WKS SNGL FETUS: CPT | Performed by: OBSTETRICS & GYNECOLOGY

## 2024-04-29 ENCOUNTER — ROUTINE PRENATAL (OUTPATIENT)
Dept: OBGYN CLINIC | Facility: CLINIC | Age: 24
End: 2024-04-29
Payer: MEDICAID

## 2024-04-29 VITALS
HEART RATE: 80 BPM | WEIGHT: 120 LBS | BODY MASS INDEX: 23 KG/M2 | SYSTOLIC BLOOD PRESSURE: 101 MMHG | DIASTOLIC BLOOD PRESSURE: 67 MMHG

## 2024-04-29 DIAGNOSIS — R82.998 LEUKOCYTES IN URINE: ICD-10-CM

## 2024-04-29 DIAGNOSIS — Z34.82 ENCOUNTER FOR SUPERVISION OF OTHER NORMAL PREGNANCY IN SECOND TRIMESTER (HCC): Primary | ICD-10-CM

## 2024-04-29 LAB
APPEARANCE: CLEAR
BILIRUBIN: NEGATIVE
GLUCOSE (URINE DIPSTICK): NEGATIVE MG/DL
MULTISTIX LOT#: ABNORMAL NUMERIC
NITRITE, URINE: NEGATIVE
OCCULT BLOOD: NEGATIVE
PH, URINE: 6.5 (ref 4.5–8)
SPECIFIC GRAVITY: 1.02 (ref 1–1.03)
URINE-COLOR: YELLOW
UROBILINOGEN,SEMI-QN: 1 MG/DL (ref 0–1.9)

## 2024-04-29 PROCEDURE — 81003 URINALYSIS AUTO W/O SCOPE: CPT | Performed by: OBSTETRICS & GYNECOLOGY

## 2024-04-29 PROCEDURE — 99213 OFFICE O/P EST LOW 20 MIN: CPT | Performed by: OBSTETRICS & GYNECOLOGY

## 2024-04-29 NOTE — PROGRESS NOTES
Nicky Mcleod is a 24 year old female  Patient's last menstrual period was 12/10/2023 (exact date).   Chief Complaint   Patient presents with    Prenatal Care     Patient had light bleeding yesterday but did not notice any bleeding today    No c/o today.  Ordered ultrasound, to schedule now.  To call for any problems.     OBSTETRICS HISTORY:     OB History    Para Term  AB Living   2 1 1 0 0 1   SAB IAB Ectopic Multiple Live Births   0 0 0 0 1      # Outcome Date GA Lbr Grayson/2nd Weight Sex Type Anes PTL Lv   2 Current            1 Term 19 37w1d 03:40 / 01:20 6 lb 14.2 oz (3.125 kg) F NORMAL SPONT None N ELENA      Birth Comments: Mother's age: 18  Maternal Blood Type: O Positive  : 1  Para: 1  Hearing Test:   Bili T: 8.80      Complications: Other - see comments       GYNE HISTORY:         No data recorded      Latest Ref Rng & Units 2024     9:56 AM   RECENT PAP RESULTS   Thinprep Pap Negative for intraepithelial lesion or malignancy Negative for intraepithelial lesion or malignancy          MEDICAL HISTORY:     Past Medical History:    Amenorrhea    irregular       SURGICAL HISTORY:     No past surgical history on file.    SOCIAL HISTORY:     Social History     Socioeconomic History    Marital status:      Spouse name: Uriah    Number of children: 0    Years of education: 12    Highest education level: High school graduate   Occupational History    Occupation: unemployed   Tobacco Use    Smoking status: Never    Smokeless tobacco: Never   Vaping Use    Vaping status: Never Used   Substance and Sexual Activity    Alcohol use: Never    Drug use: Never   Other Topics Concern    Caffeine Concern No    Exercise No    Seat Belt Yes    Special Diet No    Stress Concern No     Service No    Hobby Hazards No     Social Determinants of Health     Financial Resource Strain: Low Risk  (3/26/2024)    Financial Resource Strain     Difficulty of Paying Living Expenses:  Not hard at all     Med Affordability: No   Food Insecurity: No Food Insecurity (3/26/2024)    Food Insecurity     Food Insecurity: Never true   Transportation Needs: No Transportation Needs (3/26/2024)    Transportation Needs     Lack of Transportation: No   Stress: No Stress Concern Present (3/26/2024)    Stress     Feeling of Stress : No   Housing Stability: Low Risk  (3/26/2024)    Housing Stability     Housing Instability: No        FAMILY HISTORY:     Family History   Problem Relation Age of Onset    Hypertension Mother     Diabetes Paternal Grandmother     Hypertension Paternal Grandmother     Other (Other) Maternal Grandmother     Cancer Neg        MEDICATIONS:       Current Outpatient Medications:     prenatal vitamin with DHA 27-0.8-228 MG Oral Cap, Take 1 capsule by mouth daily., Disp: 90 capsule, Rfl: 0    Pyridoxine HCl 25 MG Oral Tab, Take 1 tablet (25 mg total) by mouth daily., Disp: 30 tablet, Rfl: 0    Ferrous Sulfate 325 (65 Fe) MG Oral Tab, Take 1 tablet (325 mg total) by mouth daily with breakfast., Disp: 30 tablet, Rfl: 5    Aspirin 81 MG Oral Cap, Take 2 tablets by mouth daily. Begin taking at 12 weeks, Disp: 60 capsule, Rfl: 5    Cholecalciferol (VITAMIN D) 50 MCG (2000 UT) Oral Cap, Take 1 capsule (2,000 Units total) by mouth daily., Disp: 30 capsule, Rfl: 5    ALLERGIES:     No Known Allergies      REVIEW OF SYSTEMS:     Constitutional:    denies fever / chills  Cardiovascular:  denies chest pain or palpitations  Respiratory:    denies shortness of breath  Gastrointestinal:  denies severe abdominal pain, frequent diarrhea or constipation, nausea / vomiting  Genitourinary:    denies dysuria, bothersome incontinence  Skin/Breast:   denies any breast pain, lumps, or discharge  Neurological:    denies frequent severe headaches  Psychiatric:   denies depression or anxiety, thoughts of harming self or others      PHYSICAL EXAM:   Blood pressure 101/67, pulse 80, weight 120 lb (54.4 kg), last  menstrual period 12/10/2023, currently breastfeeding.  Constitutional:  well developed, well nourished, no distress  Abdomen:   soft, gravid, nontender  Musculoskeletal: no cva tenderness bilaterally  Skin/Hair:  no unusual rashes or bruises  Extremities:  no edema, no cyanosis, non tender bilaterally  Psychiatric:   oriented to time, place, person and situation. Appropriate mood and affect      ASSESSMENT & PLAN:     Nicky was seen today for prenatal care.    Diagnoses and all orders for this visit:    Encounter for supervision of other normal pregnancy in second trimester ([facility])  -     POC Urinalysis, Automated Dip without microscopy (PCA and EMMG ONLY) [15337]          FOLLOW-UP     No follow-ups on file.      Woody Gamez MD  4/29/2024

## 2024-04-30 ENCOUNTER — TELEPHONE (OUTPATIENT)
Dept: OBGYN CLINIC | Facility: CLINIC | Age: 24
End: 2024-04-30

## 2024-04-30 DIAGNOSIS — Z32.01 PREGNANCY TEST POSITIVE (HCC): Primary | ICD-10-CM

## 2024-04-30 NOTE — TELEPHONE ENCOUNTER
Per office visit of 4/29, patient was to schedule ultrasound. When patient attempted to schedule ultrasound, no order was placed. Please call with .

## 2024-05-07 ENCOUNTER — HOSPITAL ENCOUNTER (OUTPATIENT)
Dept: ULTRASOUND IMAGING | Age: 24
Discharge: HOME | End: 2024-05-07
Attending: OBSTETRICS & GYNECOLOGY
Payer: MEDICAID

## 2024-05-07 DIAGNOSIS — Z32.01 PREGNANCY TEST POSITIVE (HCC): ICD-10-CM

## 2024-05-07 PROCEDURE — 76816 OB US FOLLOW-UP PER FETUS: CPT | Performed by: OBSTETRICS & GYNECOLOGY

## 2024-05-10 ENCOUNTER — HOSPITAL ENCOUNTER (OUTPATIENT)
Age: 24
Discharge: HOME | End: 2024-05-10

## 2024-05-10 VITALS
RESPIRATION RATE: 16 BRPM | SYSTOLIC BLOOD PRESSURE: 115 MMHG | DIASTOLIC BLOOD PRESSURE: 63 MMHG | TEMPERATURE: 98 F | OXYGEN SATURATION: 95 % | HEART RATE: 76 BPM

## 2024-05-10 DIAGNOSIS — O26.899 ABDOMINAL PAIN AFFECTING PREGNANCY (HCC): Primary | ICD-10-CM

## 2024-05-10 DIAGNOSIS — R81 GLYCOSURIA: ICD-10-CM

## 2024-05-10 DIAGNOSIS — O23.40 URINARY TRACT INFECTION IN MOTHER DURING PREGNANCY, ANTEPARTUM (HCC): ICD-10-CM

## 2024-05-10 DIAGNOSIS — O46.90 VAGINAL BLEEDING DURING PREGNANCY (HCC): ICD-10-CM

## 2024-05-10 DIAGNOSIS — R10.9 ABDOMINAL PAIN AFFECTING PREGNANCY (HCC): Primary | ICD-10-CM

## 2024-05-10 LAB
BILIRUB UR QL STRIP: NEGATIVE
COLOR UR: YELLOW
GLUCOSE UR STRIP-MCNC: 250 MG/DL
KETONES UR STRIP-MCNC: NEGATIVE MG/DL
NITRITE UR QL STRIP: NEGATIVE
PH UR STRIP: 8 [PH]
PROT UR STRIP-MCNC: NEGATIVE MG/DL
SP GR UR STRIP: 1.01
UROBILINOGEN UR STRIP-ACNC: <2 MG/DL

## 2024-05-10 PROCEDURE — 99214 OFFICE O/P EST MOD 30 MIN: CPT

## 2024-05-10 PROCEDURE — 87086 URINE CULTURE/COLONY COUNT: CPT | Performed by: PHYSICIAN ASSISTANT

## 2024-05-10 PROCEDURE — 81002 URINALYSIS NONAUTO W/O SCOPE: CPT

## 2024-05-10 RX ORDER — CEPHALEXIN 500 MG/1
500 CAPSULE ORAL 3 TIMES DAILY
Qty: 21 CAPSULE | Refills: 0 | Status: SHIPPED | OUTPATIENT
Start: 2024-05-10 | End: 2024-05-17

## 2024-05-10 NOTE — ED INITIAL ASSESSMENT (HPI)
Patient with episodes of sanguinous vaginal discharge x 3 today.  Patient is currently 21 weeks pregnant.  States she has ovarian pain.

## 2024-05-10 NOTE — ED PROVIDER NOTES
Patient Seen in: [facility]    History     Chief Complaint   Patient presents with    Pregnancy Issues            Stated Complaint: Eval-g    HPI    Nicky Mcleod is a 24 year old female, 21 weeks pregnant, who presents with chief complaint of vaginal bleeding.  Onset today.  Patient states that this is a decreased amount of bleeding compared to her typical menstrual period.  Patient reports associated lower abdominal pain.  Patient denies fever, chills, nausea, vomiting, diarrhea, constipation, melena, hematochezia, dysuria, hematuria, flank pain.          Past Medical History:    Amenorrhea    irregular       No past surgical history on file.         Family History   Problem Relation Age of Onset    Hypertension Mother     Diabetes Paternal Grandmother     Hypertension Paternal Grandmother     Other (Other) Maternal Grandmother     Cancer Neg        Social History     Socioeconomic History    Marital status:      Spouse name: Uriah    Number of children: 0    Years of education: 12    Highest education level: High school graduate   Occupational History    Occupation: unemployed   Tobacco Use    Smoking status: Never    Smokeless tobacco: Never   Vaping Use    Vaping status: Never Used   Substance and Sexual Activity    Alcohol use: Never    Drug use: Never   Other Topics Concern    Caffeine Concern No    Exercise No    Seat Belt Yes    Special Diet No    Stress Concern No     Service No    Hobby Hazards No     Social Determinants of Health     Financial Resource Strain: Low Risk  (3/26/2024)    Financial Resource Strain     Difficulty of Paying Living Expenses: Not hard at all     Med Affordability: No   Food Insecurity: No Food Insecurity (3/26/2024)    Food Insecurity     Food Insecurity: Never true   Transportation Needs: No Transportation Needs (3/26/2024)    Transportation Needs     Lack of Transportation: No   Stress: No Stress Concern Present (3/26/2024)    Stress      Feeling of Stress : No   Housing Stability: Low Risk  (3/26/2024)    Housing Stability     Housing Instability: No       Review of Systems    Positive for stated complaint: Eval-g  Other systems are as noted in HPI.  Constitutional and vital signs reviewed.      All other systems reviewed and negative except as noted above.    PSFH elements reviewed from today and agreed except as otherwise stated in HPI.    Physical Exam     ED Triage Vitals [05/10/24 1748]   /63   Pulse 76   Resp 16   Temp 98 °F (36.7 °C)   Temp src Temporal   SpO2 95 %   O2 Device None (Room air)       Current:/63   Pulse 76   Temp 98 °F (36.7 °C) (Temporal)   Resp 16   LMP 12/10/2023 (Exact Date)   SpO2 95%     PULSE OX within normal limits on room air as interpreted by this provider.        Physical Exam    Constitutional: The patient is cooperative. Appears well-developed and well-nourished.  No acute distress.  Psychological: Alert, No abnormalities of mood, affect.  Head: Normocephalic/atraumatic.  Eyes: Pupils are equal round reactive to light.  Conjunctiva are within normal limits.  ENT: Oropharynx is clear.  Mucous membranes moist.  Neck: The neck is supple.  No meningeal signs.  Chest: There is no tenderness to the chest wall.  No CVA tenderness bilaterally.  Respiratory: Respiratory effort was normal.  There is no stridor.  Air entry is equal.  Cardiovascular: Regular rate and rhythm.  Capillary refill is brisk.    Gastrointestinal: Gravid abdomen.  Abdomen soft, nontender, nondistended.  There is no rebound tenderness or guarding.  No organomegaly is noted. No peritoneal signs.  Normal bowel sounds.  Genitourinary: Not examined.  Lymphatic: No gross lymphadenopathy noted.  Musculoskeletal: Musculoskeletal system is grossly intact.  There is no obvious deformity.  Neurological: Gross motor movement is intact in all 4 extremities.  Patient exhibits normal speech.  Skin: Skin is normal to inspection.  Warm and dry.  No  obvious bruising.  No obvious rash.          ED Course     Labs Reviewed   [facility] POCT URINALYSIS DIPSTICK - Abnormal; Notable for the following components:       Result Value    Urine Clarity Cloudy (*)     Glucose, Urine 250 (*)     Blood, Urine Trace-Intact (*)     Leukocyte esterase urine Small (*)     All other components within normal limits   URINE CULTURE, ROUTINE       MDM      via [employer] utilized to communicate with patient.    Positive glucose, trace-intact blood and small leukocyte esterase present on urine dip.  Urine culture pending.  Will treat empirically with Keflex.    24-year-old female, 21 weeks pregnant presents with chief complaint of vaginal bleeding and abdominal pain.  Discussed with patient that immediate care is not equipped to monitor fetus.  Recommended patient report to [facility] for fetal monitoring.      Physical exam remained stable as previously documented.  Available results reviewed with patient.    I have given the patient instructions regarding their diagnoses, expectations, follow up, and ER precautions. I explained to the patient that emergent conditions may arise and to go to the ER for new, worsening or any persistent conditions. I've explained the importance of following up with their doctor as instructed. The patient verbalized understanding of the discharge instructions and plan.            Disposition and Plan     Clinical Impression:  1. Abdominal pain affecting pregnancy (HCC)    2. Vaginal bleeding during pregnancy (HCC)    3. Glycosuria    4. Urinary tract infection in mother during pregnancy, antepartum (HCC)        Disposition:  Discharge    Follow-up:  [facility]  [address]  [facility] [address]  [phone]  Go today        Medications Prescribed:  Current Discharge Medication List        START taking these medications    Details   cephalexin (KEFLEX) 500 MG Oral Cap Take 1 capsule (500  mg total) by mouth 3 (three) times daily for 7 days.  Qty: 21 capsule, Refills: 0

## 2024-05-15 ENCOUNTER — TELEPHONE (OUTPATIENT)
Dept: OBGYN CLINIC | Facility: CLINIC | Age: 24
End: 2024-05-15

## 2024-05-15 ENCOUNTER — ROUTINE PRENATAL (OUTPATIENT)
Dept: OBGYN CLINIC | Facility: CLINIC | Age: 24
End: 2024-05-15

## 2024-05-15 VITALS
BODY MASS INDEX: 24 KG/M2 | DIASTOLIC BLOOD PRESSURE: 69 MMHG | HEART RATE: 85 BPM | SYSTOLIC BLOOD PRESSURE: 105 MMHG | WEIGHT: 123 LBS

## 2024-05-15 DIAGNOSIS — Z34.82 ENCOUNTER FOR SUPERVISION OF OTHER NORMAL PREGNANCY IN SECOND TRIMESTER (HCC): Primary | ICD-10-CM

## 2024-05-15 LAB
APPEARANCE: CLEAR
BILIRUBIN: NEGATIVE
GLUCOSE (URINE DIPSTICK): NEGATIVE MG/DL
MULTISTIX LOT#: ABNORMAL NUMERIC
NITRITE, URINE: NEGATIVE
PH, URINE: 7 (ref 4.5–8)
SPECIFIC GRAVITY: 1.02 (ref 1–1.03)
URINE-COLOR: YELLOW
UROBILINOGEN,SEMI-QN: 0.2 MG/DL (ref 0–1.9)

## 2024-05-15 PROCEDURE — 99214 OFFICE O/P EST MOD 30 MIN: CPT | Performed by: OBSTETRICS & GYNECOLOGY

## 2024-05-15 PROCEDURE — 81003 URINALYSIS AUTO W/O SCOPE: CPT | Performed by: OBSTETRICS & GYNECOLOGY

## 2024-05-15 RX ORDER — FERROUS SULFATE 325(65) MG
325 TABLET ORAL
Qty: 30 TABLET | Refills: 5 | Status: SHIPPED | OUTPATIENT
Start: 2024-05-15

## 2024-05-15 NOTE — PROGRESS NOTES
Nicky Mcleod is a 24 year old female  Patient's last menstrual period was 12/10/2023 (exact date).   Chief Complaint   Patient presents with    Prenatal Care     Pt c/o bleeding that started on Monday, lasted until Tuesday evening. Denies bleeding today. Was recently treated for UTI but instructed to stop taking antibiotics since urine cx came back normal .        OBSTETRICS HISTORY:     OB History    Para Term  AB Living   2 1 1 0 0 1   SAB IAB Ectopic Multiple Live Births   0 0 0 0 1      # Outcome Date GA Lbr Grayson/2nd Weight Sex Type Anes PTL Lv   2 Current            1 Term 19 37w1d 03:40 / 01:20 6 lb 14.2 oz (3.125 kg) F NORMAL SPONT None N ELENA      Birth Comments: Mother's age: 18  Maternal Blood Type: O Positive  : 1  Para: 1  Hearing Test:   Bili T: 8.80      Complications: Other - see comments       GYNE HISTORY:         No data recorded      Latest Ref Rng & Units 2024     9:56 AM   RECENT PAP RESULTS   Thinprep Pap Negative for intraepithelial lesion or malignancy Negative for intraepithelial lesion or malignancy          MEDICAL HISTORY:     Past Medical History:    Amenorrhea    irregular       SURGICAL HISTORY:     History reviewed. No pertinent surgical history.    SOCIAL HISTORY:     Social History     Socioeconomic History    Marital status:      Spouse name: Uriah    Number of children: 0    Years of education: 12    Highest education level: High school graduate   Occupational History    Occupation: unemployed   Tobacco Use    Smoking status: Never    Smokeless tobacco: Never   Vaping Use    Vaping status: Never Used   Substance and Sexual Activity    Alcohol use: Never    Drug use: Never   Other Topics Concern    Caffeine Concern No    Exercise No    Seat Belt Yes    Special Diet No    Stress Concern No     Service No    Hobby Hazards No     Social Determinants of Health     Financial Resource Strain: Low Risk  (3/26/2024)    Financial  Resource Strain     Difficulty of Paying Living Expenses: Not hard at all     Med Affordability: No   Food Insecurity: No Food Insecurity (3/26/2024)    Food Insecurity     Food Insecurity: Never true   Transportation Needs: No Transportation Needs (3/26/2024)    Transportation Needs     Lack of Transportation: No   Stress: No Stress Concern Present (3/26/2024)    Stress     Feeling of Stress : No   Housing Stability: Low Risk  (3/26/2024)    Housing Stability     Housing Instability: No        FAMILY HISTORY:     Family History   Problem Relation Age of Onset    Hypertension Mother     Diabetes Paternal Grandmother     Hypertension Paternal Grandmother     Other (Other) Maternal Grandmother     Cancer Neg        MEDICATIONS:       Current Outpatient Medications:     Ferrous Sulfate 325 (65 Fe) MG Oral Tab, Take 1 tablet (325 mg total) by mouth daily with breakfast., Disp: 30 tablet, Rfl: 5    cephalexin (KEFLEX) 500 MG Oral Cap, Take 1 capsule (500 mg total) by mouth 3 (three) times daily for 7 days., Disp: 21 capsule, Rfl: 0    prenatal vitamin with DHA 27-0.8-228 MG Oral Cap, Take 1 capsule by mouth daily., Disp: 90 capsule, Rfl: 0    Pyridoxine HCl 25 MG Oral Tab, Take 1 tablet (25 mg total) by mouth daily., Disp: 30 tablet, Rfl: 0    Aspirin 81 MG Oral Cap, Take 2 tablets by mouth daily. Begin taking at 12 weeks, Disp: 60 capsule, Rfl: 5    Cholecalciferol (VITAMIN D) 50 MCG (2000 UT) Oral Cap, Take 1 capsule (2,000 Units total) by mouth daily., Disp: 30 capsule, Rfl: 5    ALLERGIES:     No Known Allergies      REVIEW OF SYSTEMS:     Constitutional:    denies fever / chills  Cardiovascular:  denies chest pain or palpitations  Respiratory:    denies shortness of breath  Gastrointestinal:  denies severe abdominal pain, frequent diarrhea or constipation, nausea / vomiting  Genitourinary:    denies dysuria, bothersome incontinence  Skin/Breast:   denies any breast pain, lumps, or discharge  Neurological:    denies  frequent severe headaches  Psychiatric:   denies depression or anxiety, thoughts of harming self or others      PHYSICAL EXAM:   Blood pressure 105/69, pulse 85, weight 123 lb (55.8 kg), last menstrual period 12/10/2023, currently breastfeeding.  Constitutional:  well developed, well nourished, no distress  Abdomen:   soft, gravid, nontender  Musculoskeletal: no cva tenderness bilaterally  Skin/Hair:  no unusual rashes or bruises  Extremities:  no edema, no cyanosis, non tender bilaterally  Psychiatric:   oriented to time, place, person and situation. Appropriate mood and affect  Sse: os closed. Cervx thick, sve : closed/thick  Minimal bloody discharge at ext os    ASSESSMENT & PLAN:     Nicky was seen today for prenatal care.    Diagnoses and all orders for this visit:    Encounter for supervision of other normal pregnancy in second trimester (HCC)  -     POC Urinalysis, Automated Dip without microscopy (PCA and EMMG ONLY) [26163]    Prenatal checkless second trimester counseling has been completed.  I spent 32 minutes face-to-face with the patient, over half of the time in discussion and counseling of the below factors:  -Abnormal lab values.  I discussed all the current lab values that have returned.  I spent time discussing both normal and any abnormal lab values.  -Selecting a  care provider.  The patient was asked if she has a specific pediatrician that she would like for me to assign.  There is an [facility] group that can be assigned if the patient does not have a specific designated provider.  -Prenatal classes.  I discussed with the patient that she can sign up for prenatal classes at [email or web address].  -Signs and symptoms of  labor.  We discussed symptoms such as vaginal bleeding and leakage of fluid vaginally.  These are symptoms that should be brought to the attention of one of the providers.  We discussed round ligament pain and how to help differentiate from  labor pain.  We discussed  symptoms and treatment of lower back pain and how to prevent back injury in pregnancy.        FOLLOW-UP     Return in about 2 weeks (around 5/29/2024) for prenatal visit.      Negrito Cm MD  5/15/2024

## 2024-05-15 NOTE — TELEPHONE ENCOUNTER
Pt Name and  verified.  OB History    Para Term  AB Living   2 1 1 0 0 1   SAB IAB Ectopic Multiple Live Births   0 0 0 0 1       Pt is calling in regard to a possible UTI. Pt states that she was prescribed cephalexin on 5/10 and states that she noticed some mild spotting on wiping when using the restroom on Monday.  Today she has not noticed any more bleeding on wiping or using the restroom. She is concerned and would like to be seen in office. Offered apt with Dr. Cm and scheduled her at 12:30.

## 2024-05-15 NOTE — TELEPHONE ENCOUNTER
Patient 21 weeks pregnant, she went to UC on 5/10 for UTI. They gave her medication which is caused her to bleed on Mon & Tues. Patient is no longer bleeding and would like to discuss  Malawian speaking

## 2024-05-16 RX ORDER — CHOLECALCIFEROL (VITAMIN D3) 25 MCG
1 TABLET,CHEWABLE ORAL DAILY
Qty: 90 CAPSULE | Refills: 0 | OUTPATIENT
Start: 2024-05-16

## 2024-05-28 ENCOUNTER — ROUTINE PRENATAL (OUTPATIENT)
Dept: OBGYN CLINIC | Facility: CLINIC | Age: 24
End: 2024-05-28

## 2024-05-28 VITALS — BODY MASS INDEX: 24 KG/M2 | SYSTOLIC BLOOD PRESSURE: 92 MMHG | DIASTOLIC BLOOD PRESSURE: 68 MMHG | WEIGHT: 123.81 LBS

## 2024-05-28 DIAGNOSIS — Z34.82 ENCOUNTER FOR SUPERVISION OF OTHER NORMAL PREGNANCY IN SECOND TRIMESTER (HCC): Primary | ICD-10-CM

## 2024-05-28 LAB
APPEARANCE: CLEAR
GLUCOSE (URINE DIPSTICK): NEGATIVE MG/DL
MULTISTIX LOT#: ABNORMAL NUMERIC
NITRITE, URINE: NEGATIVE
OCCULT BLOOD: NEGATIVE
PH, URINE: 5.5 (ref 4.5–8)
PROTEIN (URINE DIPSTICK): 30 MG/DL
SPECIFIC GRAVITY: 1.03 (ref 1–1.03)
URINE-COLOR: YELLOW
UROBILINOGEN,SEMI-QN: 0.2 MG/DL (ref 0–1.9)

## 2024-05-28 PROCEDURE — 81002 URINALYSIS NONAUTO W/O SCOPE: CPT | Performed by: OBSTETRICS & GYNECOLOGY

## 2024-05-28 PROCEDURE — 99212 OFFICE O/P EST SF 10 MIN: CPT | Performed by: OBSTETRICS & GYNECOLOGY

## 2024-05-28 NOTE — PROGRESS NOTES
Fetal kick counts discussed with  patient, labor precautions given.  Continue asa 2 tabs daily for hx pih

## 2024-07-15 ENCOUNTER — ROUTINE PRENATAL (OUTPATIENT)
Dept: OBGYN CLINIC | Facility: CLINIC | Age: 24
End: 2024-07-15
Payer: MEDICAID

## 2024-07-15 VITALS
HEART RATE: 79 BPM | SYSTOLIC BLOOD PRESSURE: 107 MMHG | BODY MASS INDEX: 25 KG/M2 | WEIGHT: 129 LBS | DIASTOLIC BLOOD PRESSURE: 69 MMHG

## 2024-07-15 DIAGNOSIS — Z34.83 ENCOUNTER FOR SUPERVISION OF OTHER NORMAL PREGNANCY IN THIRD TRIMESTER (HCC): Primary | ICD-10-CM

## 2024-07-15 PROBLEM — O09.299 HISTORY OF HELLP SYNDROME, CURRENTLY PREGNANT (HCC): Status: ACTIVE | Noted: 2019-09-13

## 2024-07-15 NOTE — PROGRESS NOTES
Pre-eclamptic precautions given and labs ordered - Has been in [place]. . Kick Counts reviewed.  Catch up labs, CBC, Ferritin, HIV, RPR labs ordered    Tdap given today - benefits reviewed.       Lab Results   Component Value Date    GLU1OB 65 (L) 03/11/2024

## 2024-07-16 ENCOUNTER — LAB ENCOUNTER (OUTPATIENT)
Dept: LAB | Facility: HOSPITAL | Age: 24
End: 2024-07-16
Attending: OBSTETRICS & GYNECOLOGY
Payer: MEDICAID

## 2024-07-16 DIAGNOSIS — Z34.83 ENCOUNTER FOR SUPERVISION OF OTHER NORMAL PREGNANCY IN THIRD TRIMESTER (HCC): ICD-10-CM

## 2024-07-16 LAB
ALBUMIN SERPL-MCNC: 3.7 G/DL (ref 3.2–4.8)
ALBUMIN/GLOB SERPL: 1.5 {RATIO} (ref 1–2)
ALP LIVER SERPL-CCNC: 96 U/L
ALT SERPL-CCNC: 15 U/L
ANION GAP SERPL CALC-SCNC: 6 MMOL/L (ref 0–18)
AST SERPL-CCNC: 20 U/L (ref ?–34)
BASOPHILS # BLD AUTO: 0.04 X10(3) UL (ref 0–0.2)
BASOPHILS NFR BLD AUTO: 0.5 %
BILIRUB SERPL-MCNC: 0.4 MG/DL (ref 0.3–1.2)
BUN BLD-MCNC: 6 MG/DL (ref 9–23)
BUN/CREAT SERPL: 10.7 (ref 10–20)
CALCIUM BLD-MCNC: 8.6 MG/DL (ref 8.7–10.4)
CHLORIDE SERPL-SCNC: 111 MMOL/L (ref 98–112)
CO2 SERPL-SCNC: 22 MMOL/L (ref 21–32)
CREAT BLD-MCNC: 0.56 MG/DL
CREAT UR-SCNC: 131.2 MG/DL
DEPRECATED HBV CORE AB SER IA-ACNC: 6.3 NG/ML
DEPRECATED RDW RBC AUTO: 40.5 FL (ref 35.1–46.3)
EGFRCR SERPLBLD CKD-EPI 2021: 131 ML/MIN/1.73M2 (ref 60–?)
EOSINOPHIL # BLD AUTO: 0.02 X10(3) UL (ref 0–0.7)
EOSINOPHIL NFR BLD AUTO: 0.2 %
ERYTHROCYTE [DISTWIDTH] IN BLOOD BY AUTOMATED COUNT: 12.4 % (ref 11–15)
FASTING STATUS PATIENT QL REPORTED: NO
GLOBULIN PLAS-MCNC: 2.5 G/DL (ref 2–3.5)
GLUCOSE 1H P GLC SERPL-MCNC: 140 MG/DL
GLUCOSE BLD-MCNC: 140 MG/DL (ref 70–99)
HCT VFR BLD AUTO: 40.3 %
HGB BLD-MCNC: 13.7 G/DL
IMM GRANULOCYTES # BLD AUTO: 0.03 X10(3) UL (ref 0–1)
IMM GRANULOCYTES NFR BLD: 0.4 %
LYMPHOCYTES # BLD AUTO: 1.1 X10(3) UL (ref 1–4)
LYMPHOCYTES NFR BLD AUTO: 13.6 %
MCH RBC QN AUTO: 30.1 PG (ref 26–34)
MCHC RBC AUTO-ENTMCNC: 34 G/DL (ref 31–37)
MCV RBC AUTO: 88.6 FL
MONOCYTES # BLD AUTO: 0.54 X10(3) UL (ref 0.1–1)
MONOCYTES NFR BLD AUTO: 6.7 %
NEUTROPHILS # BLD AUTO: 6.34 X10 (3) UL (ref 1.5–7.7)
NEUTROPHILS # BLD AUTO: 6.34 X10(3) UL (ref 1.5–7.7)
NEUTROPHILS NFR BLD AUTO: 78.6 %
OSMOLALITY SERPL CALC.SUM OF ELEC: 288 MOSM/KG (ref 275–295)
PLATELET # BLD AUTO: 179 10(3)UL (ref 150–450)
POTASSIUM SERPL-SCNC: 3.6 MMOL/L (ref 3.5–5.1)
PROT SERPL-MCNC: 6.2 G/DL (ref 5.7–8.2)
PROT UR-MCNC: 22.5 MG/DL (ref ?–14)
PROT/CREAT UR-RTO: 0.17
RBC # BLD AUTO: 4.55 X10(6)UL
SODIUM SERPL-SCNC: 139 MMOL/L (ref 136–145)
T PALLIDUM AB SER QL IA: NONREACTIVE
URATE SERPL-MCNC: 4 MG/DL
WBC # BLD AUTO: 8.1 X10(3) UL (ref 4–11)

## 2024-07-16 PROCEDURE — 84550 ASSAY OF BLOOD/URIC ACID: CPT

## 2024-07-16 PROCEDURE — 82728 ASSAY OF FERRITIN: CPT

## 2024-07-16 PROCEDURE — 80053 COMPREHEN METABOLIC PANEL: CPT

## 2024-07-16 PROCEDURE — 82570 ASSAY OF URINE CREATININE: CPT

## 2024-07-16 PROCEDURE — 36415 COLL VENOUS BLD VENIPUNCTURE: CPT

## 2024-07-16 PROCEDURE — 86780 TREPONEMA PALLIDUM: CPT

## 2024-07-16 PROCEDURE — 87389 HIV-1 AG W/HIV-1&-2 AB AG IA: CPT

## 2024-07-16 PROCEDURE — 85025 COMPLETE CBC W/AUTO DIFF WBC: CPT | Performed by: OBSTETRICS & GYNECOLOGY

## 2024-07-16 PROCEDURE — 84156 ASSAY OF PROTEIN URINE: CPT

## 2024-07-16 PROCEDURE — 82950 GLUCOSE TEST: CPT

## 2024-07-17 PROBLEM — O99.012 ANEMIA COMPLICATING PREGNANCY IN SECOND TRIMESTER (HCC): Status: ACTIVE | Noted: 2024-07-17

## 2024-07-19 ENCOUNTER — LABORATORY ENCOUNTER (OUTPATIENT)
Dept: LAB | Age: 24
End: 2024-07-19
Attending: OBSTETRICS & GYNECOLOGY
Payer: MEDICAID

## 2024-07-19 DIAGNOSIS — O99.810 ABNORMAL MATERNAL GLUCOSE TOLERANCE, ANTEPARTUM (HCC): ICD-10-CM

## 2024-07-19 LAB
GLUCOSE 1H P GLC SERPL-MCNC: 140 MG/DL
GLUCOSE 2H P GLC SERPL-MCNC: 116 MG/DL
GLUCOSE 3H P GLC SERPL-MCNC: 108 MG/DL (ref 70–140)
GLUCOSE P FAST SERPL-MCNC: 76 MG/DL

## 2024-07-19 PROCEDURE — 36415 COLL VENOUS BLD VENIPUNCTURE: CPT

## 2024-07-19 PROCEDURE — 82951 GLUCOSE TOLERANCE TEST (GTT): CPT

## 2024-07-19 PROCEDURE — 82952 GTT-ADDED SAMPLES: CPT

## 2024-07-24 ENCOUNTER — TELEPHONE (OUTPATIENT)
Dept: HEMATOLOGY/ONCOLOGY | Facility: HOSPITAL | Age: 24
End: 2024-07-24

## 2024-07-24 NOTE — TELEPHONE ENCOUNTER
Called patient to schedule appointment - first attempt.  Left message to call back.  Called patient with language line. No answer left message on voicemail

## 2024-08-05 ENCOUNTER — ROUTINE PRENATAL (OUTPATIENT)
Dept: OBGYN CLINIC | Facility: CLINIC | Age: 24
End: 2024-08-05
Payer: MEDICAID

## 2024-08-05 VITALS
WEIGHT: 131 LBS | HEART RATE: 78 BPM | DIASTOLIC BLOOD PRESSURE: 70 MMHG | BODY MASS INDEX: 26 KG/M2 | SYSTOLIC BLOOD PRESSURE: 106 MMHG

## 2024-08-05 DIAGNOSIS — O09.299 HISTORY OF HELLP SYNDROME, CURRENTLY PREGNANT (HCC): Primary | ICD-10-CM

## 2024-08-05 PROCEDURE — 99214 OFFICE O/P EST MOD 30 MIN: CPT | Performed by: OBSTETRICS & GYNECOLOGY

## 2024-08-05 NOTE — PROGRESS NOTES
Nicky Mcleod is a 24 year old female  Patient's last menstrual period was 12/10/2023 (exact date). No chief complaint on file.      OBSTETRICS HISTORY:     OB History    Para Term  AB Living   2 1 1 0 0 1   SAB IAB Ectopic Multiple Live Births   0 0 0 0 1      # Outcome Date GA Lbr Grayson/2nd Weight Sex Type Anes PTL Lv   2 Current            1 Term 19 37w1d 03:40 / 01:20 6 lb 14.2 oz (3.125 kg) F NORMAL SPONT None N ELENA      Birth Comments: Mother's age: 18  Maternal Blood Type: O Positive  : 1  Para: 1  Hearing Test:   Bili T: 8.80      Complications: Other - see comments       GYNE HISTORY:         No data recorded      Latest Ref Rng & Units 2024     9:56 AM   RECENT PAP RESULTS   Thinprep Pap Negative for intraepithelial lesion or malignancy Negative for intraepithelial lesion or malignancy          MEDICAL HISTORY:     Past Medical History:    Amenorrhea    irregular    Anemia complicating pregnancy in second trimester (HCC)       SURGICAL HISTORY:     History reviewed. No pertinent surgical history.    SOCIAL HISTORY:     Social History     Socioeconomic History    Marital status:      Spouse name: Uriah    Number of children: 0    Years of education: 12    Highest education level: High school graduate   Occupational History    Occupation: unemployed   Tobacco Use    Smoking status: Never    Smokeless tobacco: Never   Vaping Use    Vaping status: Never Used   Substance and Sexual Activity    Alcohol use: Never    Drug use: Never   Other Topics Concern    Caffeine Concern No    Exercise No    Seat Belt Yes    Special Diet No    Stress Concern No     Service No    Hobby Hazards No     Social Determinants of Health     Financial Resource Strain: Low Risk  (3/26/2024)    Financial Resource Strain     Difficulty of Paying Living Expenses: Not hard at all     Med Affordability: No   Food Insecurity: No Food Insecurity (3/26/2024)    Food Insecurity      Food Insecurity: Never true   Transportation Needs: No Transportation Needs (3/26/2024)    Transportation Needs     Lack of Transportation: No   Stress: No Stress Concern Present (3/26/2024)    Stress     Feeling of Stress : No   Housing Stability: Low Risk  (3/26/2024)    Housing Stability     Housing Instability: No        FAMILY HISTORY:     Family History   Problem Relation Age of Onset    Hypertension Mother     Diabetes Paternal Grandmother     Hypertension Paternal Grandmother     Other (Other) Maternal Grandmother     Cancer Neg        MEDICATIONS:       Current Outpatient Medications:     Ferrous Sulfate 325 (65 Fe) MG Oral Tab, Take 1 tablet (325 mg total) by mouth daily with breakfast., Disp: 30 tablet, Rfl: 5    prenatal vitamin with DHA 27-0.8-228 MG Oral Cap, Take 1 capsule by mouth daily., Disp: 90 capsule, Rfl: 0    Aspirin 81 MG Oral Cap, Take 2 tablets by mouth daily. Begin taking at 12 weeks, Disp: 60 capsule, Rfl: 5    Cholecalciferol (VITAMIN D) 50 MCG (2000 UT) Oral Cap, Take 1 capsule (2,000 Units total) by mouth daily., Disp: 30 capsule, Rfl: 5    Pyridoxine HCl 25 MG Oral Tab, Take 1 tablet (25 mg total) by mouth daily. (Patient not taking: Reported on 5/28/2024), Disp: 30 tablet, Rfl: 0    ALLERGIES:     No Known Allergies      REVIEW OF SYSTEMS:     Constitutional:    denies fever / chills  Cardiovascular:  denies chest pain or palpitations  Respiratory:    denies shortness of breath  Gastrointestinal:  denies severe abdominal pain, frequent diarrhea or constipation, nausea / vomiting  Genitourinary:    denies dysuria, bothersome incontinence  Skin/Breast:   denies any breast pain, lumps, or discharge  Neurological:    denies frequent severe headaches  Psychiatric:   denies depression or anxiety, thoughts of harming self or others      PHYSICAL EXAM:   Blood pressure 106/70, pulse 78, weight 131 lb (59.4 kg), last menstrual period 12/10/2023, currently breastfeeding.  Constitutional:  well  developed, well nourished, no distress  Abdomen:   soft, gravid, nontender  Musculoskeletal: no cva tenderness bilaterally  Skin/Hair:  no unusual rashes or bruises  Extremities:  no edema, no cyanosis, non tender bilaterally  Psychiatric:   oriented to time, place, person and situation. Appropriate mood and affect      ASSESSMENT & PLAN:     Diagnoses and all orders for this visit:    History of HELLP syndrome, currently pregnant (HCC)    Prenatal checklist third trimester counseling has been completed. I spent 33 minutes face-to-face with the patient, over half of the time in discussion and counseling of the below factors:  -Anesthesia/analgesia plans.  IV pain relief drugs d/w pt and that substantial relief of labor pain is generally not achieved with this method, and that these drugs can cross the placenta which can decreased fetal heart rate variability in utero and increased  respiratory depression and neurobehavioral changes. Up to two-thirds of women report moderate or severe pain one or two hours after administration. Epidural may be initiated at any stage of labor and can be maintained by rebolus. Efficacy dw pt and hypotension prevention with fluid bolus d/w pt  -Circumcision. D/w pt that we can most likely perform in the hospital before discharge. The rate of complications during and in the first month is approximately 0.2% including bleeding, infection, urethral complications. The decision is optional and benefits from male circumcision include prevention of : urinary tract infections, acquisition of HIV, some STD's, and penile cancer.   -Breast of bottle feeding.  Breastfeeding is strongly recommended because of direct benefits to the infant's nutrition, GI function, host defense including against otitis media and pneumonia during the first few years of life, and psychological well-being. Moderate-quality evidence exists for prevention of type 1 DM, inflammatory bowel disease, and wheezing in  young children.  - education ( screening, jaundice, SIDS prevention, car seat, vit K, erythromycin, routine care) also d/w pt.  -Family medical leave or disability forms. Our office will be happy to assist in addressing these forms.  -GBBS. This bacterial normally colonizes the GI and genital tracts of 15 to 40% of pregnant women. Colonization is asymptomatic, but if left untreated in the event of a positve rectovaginal culture (done at 36 weeks plus) can cause infection in neonates and infants less than 90 days of age. If culture positive, the administration of antibiotics in labor is associated with a 69% reduction in early onset GBBS disease compared with no prevention strategy at all. GBBS in the urine or history of infant affected by early onset GBS disease are examples of automatic treatment prophylaxis during labor and the 36 week plus culture is not needed.   -Tdap vaccine.  Ideally give b/w 27 and 36 wks EGA in each pregnancy to help provide protection against pertussis.   -Postpartum family planning/contraception/tubal sterilization.  D/w pt and all questions answered  -Birth plan.  Birth plans will be reviewed with the patient if they elect to complete one. This is not a requirement. Support persons appear to have both psychological and medical benefits.   -Labor instruction info sheet.  Labor signs and symptoms discussed with patient. Entry into hospital when labor ensues d/w pt. Some of the signs might be a gush or trickle of amniotic fluid, vaginal bleeding, regular contractions.   -Emergency blood transfusion. If deemed clinically necessary and with the patient's consent, she does accept blood transfusion. Risks and benefits of blood transfusion d/w pt:  -Fetal movement monitoring.  Patient instructed to either contact provider of come straight to labor and delivery without contacting the provider if she perceives a significant and persistant reduction in fetal movement and never to wait  longer than two hours if there is absent fetal movement. If the patient is in doubt, she can come to hospital if she counts fewer than 10 kicks over two consecutive hours when lying on her side.  -Labor signs.  If the patient is experiencing uterine tightening (usually lasting 30-60 seconds) every 10 minutes or more she may contact the provider or come to labor and delivery without calling the provider. Patient instructed to also come to labor and delivery if vaginal bleeding or leakage of fluid vaginally.   -Signs and symptoms of pregnancy induced hypertension.  New onset of hypertension after 20 weeks gestation with or wihout protein in the urine are evaluated for preeclamsia. Symptoms include persistent headache, especially if not relieved by tylenol. Other visual symptoms include scotomata, photophobia, blurred vision, or temporary blindness. Upper abdominal or epigastric pain and altered mental status, or confusion are other symptoms.  -Postpartum depression. Patient asked to inform the provider is suspected. The disorder is suspected in women who manifest anxiety about the health of the infant, concern about their ability to care for the infant, despondency, lack of interest in the infant's activities, and substance abuse disorders.         FOLLOW-UP     Return in about 1 week (around 8/12/2024) for prenatal visit.      Negrito Cm MD  8/5/2024

## 2024-08-12 ENCOUNTER — ROUTINE PRENATAL (OUTPATIENT)
Dept: OBGYN CLINIC | Facility: CLINIC | Age: 24
End: 2024-08-12
Payer: MEDICAID

## 2024-08-12 VITALS
SYSTOLIC BLOOD PRESSURE: 105 MMHG | BODY MASS INDEX: 26 KG/M2 | WEIGHT: 131 LBS | DIASTOLIC BLOOD PRESSURE: 67 MMHG | HEART RATE: 71 BPM

## 2024-08-12 DIAGNOSIS — Z34.93 NORMAL PREGNANCY IN THIRD TRIMESTER (HCC): Primary | ICD-10-CM

## 2024-08-12 PROCEDURE — 99212 OFFICE O/P EST SF 10 MIN: CPT | Performed by: OBSTETRICS & GYNECOLOGY

## 2024-08-12 NOTE — PROGRESS NOTES
Nicky Mcleod is a 24 year old female  Patient's last menstrual period was 12/10/2023 (exact date).   Chief Complaint   Patient presents with    Prenatal Care       OBSTETRICS HISTORY:     OB History    Para Term  AB Living   2 1 1 0 0 1   SAB IAB Ectopic Multiple Live Births   0 0 0 0 1      # Outcome Date GA Lbr Grayson/2nd Weight Sex Type Anes PTL Lv   2 Current            1 Term 19 37w1d 03:40 / 01:20 6 lb 14.2 oz (3.125 kg) F NORMAL SPONT None N ELENA      Birth Comments: Mother's age: 18  Maternal Blood Type: O Positive  : 1  Para: 1  Hearing Test:   Bili T: 8.80      Complications: Other - see comments       GYNE HISTORY:         No data recorded      Latest Ref Rng & Units 2024     9:56 AM   RECENT PAP RESULTS   Thinprep Pap Negative for intraepithelial lesion or malignancy Negative for intraepithelial lesion or malignancy          MEDICAL HISTORY:     Past Medical History:    Amenorrhea    irregular    Anemia complicating pregnancy in second trimester (HCC)       SURGICAL HISTORY:     History reviewed. No pertinent surgical history.    SOCIAL HISTORY:     Social History     Socioeconomic History    Marital status:      Spouse name: Uriah    Number of children: 0    Years of education: 12    Highest education level: High school graduate   Occupational History    Occupation: unemployed   Tobacco Use    Smoking status: Never    Smokeless tobacco: Never   Vaping Use    Vaping status: Never Used   Substance and Sexual Activity    Alcohol use: Never    Drug use: Never   Other Topics Concern    Caffeine Concern No    Exercise No    Seat Belt Yes    Special Diet No    Stress Concern No     Service No    Hobby Hazards No     Social Determinants of Health     Financial Resource Strain: Low Risk  (3/26/2024)    Financial Resource Strain     Difficulty of Paying Living Expenses: Not hard at all     Med Affordability: No   Food Insecurity: No Food Insecurity  (3/26/2024)    Food Insecurity     Food Insecurity: Never true   Transportation Needs: No Transportation Needs (3/26/2024)    Transportation Needs     Lack of Transportation: No   Stress: No Stress Concern Present (3/26/2024)    Stress     Feeling of Stress : No   Housing Stability: Low Risk  (3/26/2024)    Housing Stability     Housing Instability: No        FAMILY HISTORY:     Family History   Problem Relation Age of Onset    Hypertension Mother     Diabetes Paternal Grandmother     Hypertension Paternal Grandmother     Other (Other) Maternal Grandmother     Cancer Neg        MEDICATIONS:       Current Outpatient Medications:     Ferrous Sulfate 325 (65 Fe) MG Oral Tab, Take 1 tablet (325 mg total) by mouth daily with breakfast., Disp: 30 tablet, Rfl: 5    prenatal vitamin with DHA 27-0.8-228 MG Oral Cap, Take 1 capsule by mouth daily., Disp: 90 capsule, Rfl: 0    Pyridoxine HCl 25 MG Oral Tab, Take 1 tablet (25 mg total) by mouth daily., Disp: 30 tablet, Rfl: 0    Aspirin 81 MG Oral Cap, Take 2 tablets by mouth daily. Begin taking at 12 weeks, Disp: 60 capsule, Rfl: 5    Cholecalciferol (VITAMIN D) 50 MCG (2000 UT) Oral Cap, Take 1 capsule (2,000 Units total) by mouth daily., Disp: 30 capsule, Rfl: 5    ALLERGIES:     No Known Allergies      REVIEW OF SYSTEMS:     Constitutional:    denies fever / chills  Cardiovascular:  denies chest pain or palpitations  Respiratory:    denies shortness of breath  Gastrointestinal:  denies severe abdominal pain, frequent diarrhea or constipation, nausea / vomiting  Genitourinary:    denies dysuria, bothersome incontinence  Skin/Breast:   denies any breast pain, lumps, or discharge  Neurological:    denies frequent severe headaches  Psychiatric:   denies depression or anxiety, thoughts of harming self or others      PHYSICAL EXAM:   Blood pressure 105/67, pulse 71, weight 131 lb (59.4 kg), last menstrual period 12/10/2023, currently breastfeeding.  Constitutional:  well  developed, well nourished, no distress  Abdomen:   soft, gravid, nontender  Musculoskeletal: no cva tenderness bilaterally  Skin/Hair:  no unusual rashes or bruises  Extremities:  no edema, no cyanosis, non tender bilaterally  Psychiatric:   oriented to time, place, person and situation. Appropriate mood and affect      ASSESSMENT & PLAN:     Nicky was seen today for prenatal care.    Diagnoses and all orders for this visit:    Normal pregnancy in third trimester (HCC)    Fetal kick counts discussed with  patient, labor precautions given.        FOLLOW-UP     Return in about 1 week (around 8/19/2024) for prenatal visit.      Negrito Cm MD  8/12/2024

## 2024-08-22 ENCOUNTER — ROUTINE PRENATAL (OUTPATIENT)
Dept: OBGYN CLINIC | Facility: CLINIC | Age: 24
End: 2024-08-22
Payer: MEDICAID

## 2024-08-22 VITALS — WEIGHT: 134 LBS | BODY MASS INDEX: 26 KG/M2 | SYSTOLIC BLOOD PRESSURE: 107 MMHG | DIASTOLIC BLOOD PRESSURE: 70 MMHG

## 2024-08-22 DIAGNOSIS — Z34.83 ENCOUNTER FOR SUPERVISION OF OTHER NORMAL PREGNANCY IN THIRD TRIMESTER (HCC): Primary | ICD-10-CM

## 2024-08-22 PROCEDURE — 99213 OFFICE O/P EST LOW 20 MIN: CPT | Performed by: STUDENT IN AN ORGANIZED HEALTH CARE EDUCATION/TRAINING PROGRAM

## 2024-08-22 NOTE — PROGRESS NOTES
[facility]  Obstetrics and Gynecology  Prenatal Visit  Sarah Llamas PA-C    HPI   Nicky Mcleod is a 24 year old.o.  36w4d weeks.    Pt is here for routine prenatal visit. No complaints or concerns.   Denies any regular uterine contractions, spontaneous rupture membranes or vaginal bleeding.  Patient feeling normal fetal movement.    OB History     OB History    Para Term  AB Living   2 1 1 0 0 1   SAB IAB Ectopic Multiple Live Births   0 0 0 0 1      # Outcome Date GA Lbr Grayson/2nd Weight Sex Type Anes PTL Lv   2 Current            1 Term 19 37w1d 03:40 / 01:20 6 lb 14.2 oz (3.125 kg) F NORMAL SPONT None N ELENA      Birth Comments: Mother's age: 18  Maternal Blood Type: O Positive  : 1  Para: 1  Hearing Test:   Bili T: 8.80      Complications: Other - see comments     Medications     Current Outpatient Medications   Medication Sig Dispense Refill    Ferrous Sulfate 325 (65 Fe) MG Oral Tab Take 1 tablet (325 mg total) by mouth daily with breakfast. 30 tablet 5    prenatal vitamin with DHA 27-0.8-228 MG Oral Cap Take 1 capsule by mouth daily. 90 capsule 0    Pyridoxine HCl 25 MG Oral Tab Take 1 tablet (25 mg total) by mouth daily. 30 tablet 0    Aspirin 81 MG Oral Cap Take 2 tablets by mouth daily. Begin taking at 12 weeks 60 capsule 5    Cholecalciferol (VITAMIN D) 50 MCG (2000 UT) Oral Cap Take 1 capsule (2,000 Units total) by mouth daily. 30 capsule 5     Exam   /70   Wt 134 lb (60.8 kg)   LMP 12/10/2023 (Exact Date)   BMI 26.17 kg/m²   FH: 36  FHTs: 145  Assessment   Nicky is a 24 year old female  with viable IUP at 36w4d weeks.      ICD-10-CM    1. Encounter for supervision of other normal pregnancy in third trimester (HCC)  Z34.83 Strep B Culture        Plan   - GBBS collected  - RTC in 1 week    SARAH LLAMAS PA-C  1:38 PM  2024

## 2024-08-23 LAB — GROUP B STREP BY PCR FOR PCR OVT: POSITIVE

## 2024-08-26 ENCOUNTER — ROUTINE PRENATAL (OUTPATIENT)
Dept: OBGYN CLINIC | Facility: CLINIC | Age: 24
End: 2024-08-26
Payer: MEDICAID

## 2024-08-26 VITALS
DIASTOLIC BLOOD PRESSURE: 78 MMHG | WEIGHT: 133 LBS | HEART RATE: 71 BPM | BODY MASS INDEX: 26 KG/M2 | SYSTOLIC BLOOD PRESSURE: 119 MMHG

## 2024-08-26 DIAGNOSIS — Z34.83 ENCOUNTER FOR SUPERVISION OF OTHER NORMAL PREGNANCY IN THIRD TRIMESTER (HCC): Primary | ICD-10-CM

## 2024-08-26 PROCEDURE — 99213 OFFICE O/P EST LOW 20 MIN: CPT | Performed by: OBSTETRICS & GYNECOLOGY

## 2024-08-26 NOTE — PROGRESS NOTES
Nicky Mcleod is a 24 year old female  Patient's last menstrual period was 12/10/2023 (exact date).   Chief Complaint   Patient presents with    Prenatal Care   Good fm. Kick cts. No c/o    OBSTETRICS HISTORY:     OB History    Para Term  AB Living   2 1 1 0 0 1   SAB IAB Ectopic Multiple Live Births   0 0 0 0 1      # Outcome Date GA Lbr Grayson/2nd Weight Sex Type Anes PTL Lv   2 Current            1 Term 19 37w1d 03:40 / 01:20 6 lb 14.2 oz (3.125 kg) F NORMAL SPONT None N ELENA      Birth Comments: Mother's age: 18  Maternal Blood Type: O Positive  : 1  Para: 1  Hearing Test:   Bili T: 8.80      Complications: Other - see comments       GYNE HISTORY:         No data recorded      Latest Ref Rng & Units 2024     9:56 AM   RECENT PAP RESULTS   Thinprep Pap Negative for intraepithelial lesion or malignancy Negative for intraepithelial lesion or malignancy          MEDICAL HISTORY:     Past Medical History:    Amenorrhea    irregular    Anemia complicating pregnancy in second trimester (HCC)       SURGICAL HISTORY:     No past surgical history on file.    SOCIAL HISTORY:     Social History     Socioeconomic History    Marital status:      Spouse name: Uriah    Number of children: 0    Years of education: 12    Highest education level: High school graduate   Occupational History    Occupation: unemployed   Tobacco Use    Smoking status: Never    Smokeless tobacco: Never   Vaping Use    Vaping status: Never Used   Substance and Sexual Activity    Alcohol use: Never    Drug use: Never   Other Topics Concern    Caffeine Concern No    Exercise No    Seat Belt Yes    Special Diet No    Stress Concern No     Service No    Hobby Hazards No     Social Determinants of Health     Financial Resource Strain: Low Risk  (3/26/2024)    Financial Resource Strain     Difficulty of Paying Living Expenses: Not hard at all     Med Affordability: No   Food Insecurity: No Food  Insecurity (3/26/2024)    Food Insecurity     Food Insecurity: Never true   Transportation Needs: No Transportation Needs (3/26/2024)    Transportation Needs     Lack of Transportation: No   Stress: No Stress Concern Present (3/26/2024)    Stress     Feeling of Stress : No   Housing Stability: Low Risk  (3/26/2024)    Housing Stability     Housing Instability: No        FAMILY HISTORY:     Family History   Problem Relation Age of Onset    Hypertension Mother     Diabetes Paternal Grandmother     Hypertension Paternal Grandmother     Other (Other) Maternal Grandmother     Cancer Neg        MEDICATIONS:       Current Outpatient Medications:     Ferrous Sulfate 325 (65 Fe) MG Oral Tab, Take 1 tablet (325 mg total) by mouth daily with breakfast., Disp: 30 tablet, Rfl: 5    prenatal vitamin with DHA 27-0.8-228 MG Oral Cap, Take 1 capsule by mouth daily., Disp: 90 capsule, Rfl: 0    Aspirin 81 MG Oral Cap, Take 2 tablets by mouth daily. Begin taking at 12 weeks, Disp: 60 capsule, Rfl: 5    Cholecalciferol (VITAMIN D) 50 MCG (2000 UT) Oral Cap, Take 1 capsule (2,000 Units total) by mouth daily., Disp: 30 capsule, Rfl: 5    Pyridoxine HCl 25 MG Oral Tab, Take 1 tablet (25 mg total) by mouth daily. (Patient not taking: Reported on 8/26/2024), Disp: 30 tablet, Rfl: 0    ALLERGIES:     No Known Allergies      REVIEW OF SYSTEMS:     Constitutional:    denies fever / chills  Cardiovascular:  denies chest pain or palpitations  Respiratory:    denies shortness of breath  Gastrointestinal:  denies severe abdominal pain, frequent diarrhea or constipation, nausea / vomiting  Genitourinary:    denies dysuria, bothersome incontinence  Skin/Breast:   denies any breast pain, lumps, or discharge  Neurological:    denies frequent severe headaches  Psychiatric:   denies depression or anxiety, thoughts of harming self or others      PHYSICAL EXAM:   Blood pressure 119/78, pulse 71, weight 133 lb (60.3 kg), last menstrual period 12/10/2023,  currently breastfeeding.  Constitutional:  well developed, well nourished, no distress  Abdomen:   soft, gravid, nontender  Musculoskeletal: no cva tenderness bilaterally  Skin/Hair:  no unusual rashes or bruises  Extremities:  no edema, no cyanosis, non tender bilaterally  Psychiatric:   oriented to time, place, person and situation. Appropriate mood and affect      ASSESSMENT & PLAN:     There are no diagnoses linked to this encounter.      FOLLOW-UP     No follow-ups on file.      Woody Gamez MD  8/26/2024

## 2024-09-03 ENCOUNTER — HOSPITAL ENCOUNTER (OUTPATIENT)
Facility: HOSPITAL | Age: 24
Discharge: HOME | End: 2024-09-03
Attending: OBSTETRICS & GYNECOLOGY | Admitting: OBSTETRICS & GYNECOLOGY
Payer: MEDICAID

## 2024-09-03 ENCOUNTER — ROUTINE PRENATAL (OUTPATIENT)
Dept: OBGYN CLINIC | Facility: CLINIC | Age: 24
End: 2024-09-03
Payer: MEDICAID

## 2024-09-03 VITALS
SYSTOLIC BLOOD PRESSURE: 111 MMHG | BODY MASS INDEX: 26 KG/M2 | DIASTOLIC BLOOD PRESSURE: 80 MMHG | HEART RATE: 93 BPM | WEIGHT: 134 LBS

## 2024-09-03 VITALS — SYSTOLIC BLOOD PRESSURE: 115 MMHG | HEART RATE: 90 BPM | DIASTOLIC BLOOD PRESSURE: 76 MMHG

## 2024-09-03 DIAGNOSIS — Z34.83 ENCOUNTER FOR SUPERVISION OF OTHER NORMAL PREGNANCY IN THIRD TRIMESTER (HCC): Primary | ICD-10-CM

## 2024-09-03 PROCEDURE — 99213 OFFICE O/P EST LOW 20 MIN: CPT | Performed by: OBSTETRICS & GYNECOLOGY

## 2024-09-03 PROCEDURE — 99213 OFFICE O/P EST LOW 20 MIN: CPT

## 2024-09-03 PROCEDURE — 59025 FETAL NON-STRESS TEST: CPT

## 2024-09-03 NOTE — PROGRESS NOTES
Pt noted sl dec fm for 4 days,  fhts 147.  Pt sent to [facility] immediately for nst/michael, pt agreed.  Triage megha notified.   Nicky Mcleod is a 24 year old female  Patient's last menstrual period was 12/10/2023 (exact date).   Chief Complaint   Patient presents with    Prenatal Care       OBSTETRICS HISTORY:     OB History    Para Term  AB Living   2 1 1 0 0 1   SAB IAB Ectopic Multiple Live Births   0 0 0 0 1      # Outcome Date GA Lbr Grayson/2nd Weight Sex Type Anes PTL Lv   2 Current            1 Term 19 37w1d 03:40 / 01:20 6 lb 14.2 oz (3.125 kg) F NORMAL SPONT None N ELENA      Birth Comments: Mother's age: 18  Maternal Blood Type: O Positive  : 1  Para: 1  Hearing Test:   Bili T: 8.80      Complications: Other - see comments       GYNE HISTORY:         No data recorded      Latest Ref Rng & Units 2024     9:56 AM   RECENT PAP RESULTS   Thinprep Pap Negative for intraepithelial lesion or malignancy Negative for intraepithelial lesion or malignancy          MEDICAL HISTORY:     Past Medical History:    Amenorrhea    irregular    Anemia complicating pregnancy in second trimester (HCC)       SURGICAL HISTORY:     No past surgical history on file.    SOCIAL HISTORY:     Social History     Socioeconomic History    Marital status:      Spouse name: Uriah    Number of children: 0    Years of education: 12    Highest education level: High school graduate   Occupational History    Occupation: unemployed   Tobacco Use    Smoking status: Never    Smokeless tobacco: Never   Vaping Use    Vaping status: Never Used   Substance and Sexual Activity    Alcohol use: Never    Drug use: Never   Other Topics Concern    Caffeine Concern No    Exercise No    Seat Belt Yes    Special Diet No    Stress Concern No     Service No    Hobby Hazards No     Social Determinants of Health     Financial Resource Strain: Low Risk  (3/26/2024)    Financial Resource Strain     Difficulty of  Paying Living Expenses: Not hard at all     Med Affordability: No   Food Insecurity: No Food Insecurity (3/26/2024)    Food Insecurity     Food Insecurity: Never true   Transportation Needs: No Transportation Needs (3/26/2024)    Transportation Needs     Lack of Transportation: No   Stress: No Stress Concern Present (3/26/2024)    Stress     Feeling of Stress : No   Housing Stability: Low Risk  (3/26/2024)    Housing Stability     Housing Instability: No        FAMILY HISTORY:     Family History   Problem Relation Age of Onset    Hypertension Mother     Diabetes Paternal Grandmother     Hypertension Paternal Grandmother     Other (Other) Maternal Grandmother     Cancer Neg        MEDICATIONS:       Current Outpatient Medications:     Ferrous Sulfate 325 (65 Fe) MG Oral Tab, Take 1 tablet (325 mg total) by mouth daily with breakfast., Disp: 30 tablet, Rfl: 5    prenatal vitamin with DHA 27-0.8-228 MG Oral Cap, Take 1 capsule by mouth daily., Disp: 90 capsule, Rfl: 0    Pyridoxine HCl 25 MG Oral Tab, Take 1 tablet (25 mg total) by mouth daily., Disp: 30 tablet, Rfl: 0    Aspirin 81 MG Oral Cap, Take 2 tablets by mouth daily. Begin taking at 12 weeks, Disp: 60 capsule, Rfl: 5    Cholecalciferol (VITAMIN D) 50 MCG (2000 UT) Oral Cap, Take 1 capsule (2,000 Units total) by mouth daily., Disp: 30 capsule, Rfl: 5    ALLERGIES:     No Known Allergies      REVIEW OF SYSTEMS:     Constitutional:    denies fever / chills  Cardiovascular:  denies chest pain or palpitations  Respiratory:    denies shortness of breath  Gastrointestinal:  denies severe abdominal pain, frequent diarrhea or constipation, nausea / vomiting  Genitourinary:    denies dysuria, bothersome incontinence  Skin/Breast:   denies any breast pain, lumps, or discharge  Neurological:    denies frequent severe headaches  Psychiatric:   denies depression or anxiety, thoughts of harming self or others      PHYSICAL EXAM:   Blood pressure 111/80, pulse 93, weight 134  lb (60.8 kg), last menstrual period 12/10/2023, currently breastfeeding.  Constitutional:  well developed, well nourished, no distress  Abdomen:   soft, gravid, nontender  Musculoskeletal: no cva tenderness bilaterally  Skin/Hair:  no unusual rashes or bruises  Extremities:  no edema, no cyanosis, non tender bilaterally  Psychiatric:   oriented to time, place, person and situation. Appropriate mood and affect      ASSESSMENT & PLAN:     There are no diagnoses linked to this encounter.      FOLLOW-UP     No follow-ups on file.      Woody Gamez MD  9/3/2024

## 2024-09-03 NOTE — PROGRESS NOTES
Pt is a 24 year old female admitted to TR2/TR2-A.     Chief Complaint   Patient presents with    Non-stress Test     Sent from office for NST for decreased fetal movement for the last 4 days.      Pt is  38w2d intra-uterine pregnancy.  History obtained, consents signed. Oriented to room, staff, and plan of care.

## 2024-09-03 NOTE — TRIAGE
[facility]  part of [facility]      Triage Note    Nicky Mcleod Patient Status:  Outpatient    2000 MRN [id number]   Location [facility] Attending Negrito Cm MD   Hosp Day # 0 PCP Mary Graf MD      Para:   Estimated Date of Delivery: 9/15/24  Gestation: 38w2d    Chief Complaint    Non-stress Test         Allergies:  No Known Allergies    No orders of the defined types were placed in this encounter.      Lab Results   Component Value Date    WBC 8.1 2024    HGB 13.7 2024    HCT 40.3 2024    .0 2024    CREATSERUM 0.56 2024    BUN 6 (L) 2024     2024    K 3.6 2024     2024    CO2 22.0 2024     (H) 2024    CA 8.6 (L) 2024    ALB 3.7 2024    ALKPHO 96 2024    BILT 0.4 2024    TP 6.2 2024    AST 20 2024    ALT 15 2024    LIP 35 2024       Clinitek UA  Lab Results   Component Value Date    URCOLOR Yellow 05/10/2024    URCLA Cloudy (A) 05/10/2024    GLUUR 250 (A) 05/10/2024    URINEBILI Negative 05/10/2024    URINEKETONE Negative 05/10/2024    SPECGRAVITY 1.030 2024    PHUR 8.0 05/10/2024    PROTURINE Negative 05/10/2024    UROBILI <2.0 05/10/2024    URINENITRITE Negative 05/10/2024    URINELEUK Small (A) 05/10/2024    URINECUL No Growth at 18-24 hrs. 05/10/2024       UA  Lab Results   Component Value Date    COLORUR Yellow 2019    CLARITY Clear 2019    SPECGRAVITY 1.030 2024    PROUR 30 (A) 2019    GLUUR 250 (A) 05/10/2024    KETUR Negative 2019    BILUR Negative 2019    BLOODURINE Negative 2019    NITRITE Negative 2024    UROBILINOGEN <2.0 2019    LEUUR Moderate (A) 2019    UASA Negative 2019       Vitals:    24 1415   BP: 115/76   Pulse: 90       NST  Variability: Moderate           Accelerations: Yes            Decelerations: None            Baseline: 135 BPM           Uterine Irritability: No           Contractions: Irregular                                        Acoustic Stimulator: No           Nonstress Test Interpretation: Reactive           Nonstress Test Second Interpretation: Reactive          FHR Category: Category I             Additional Comments       Chief Complaint   Patient presents with    Non-stress Test     Sent from office for NST for decreased fetal movement for the last 4 days.     PT with reactive NST and +FM. Strip reviewed by Dr Cm. Bedside US performed by Dr Cm. CARA and BPP wnl. PT dc home with labor precautions and kick counts. Pt with verbalized understanding.     Julieth DING RN  9/3/2024 3:28 PM

## 2024-09-09 ENCOUNTER — ROUTINE PRENATAL (OUTPATIENT)
Dept: OBGYN CLINIC | Facility: CLINIC | Age: 24
End: 2024-09-09
Payer: MEDICAID

## 2024-09-09 VITALS — SYSTOLIC BLOOD PRESSURE: 118 MMHG | DIASTOLIC BLOOD PRESSURE: 82 MMHG | WEIGHT: 134.81 LBS | BODY MASS INDEX: 26 KG/M2

## 2024-09-09 DIAGNOSIS — Z34.83 ENCOUNTER FOR SUPERVISION OF OTHER NORMAL PREGNANCY IN THIRD TRIMESTER (HCC): Primary | ICD-10-CM

## 2024-09-09 PROCEDURE — 99213 OFFICE O/P EST LOW 20 MIN: CPT | Performed by: OBSTETRICS & GYNECOLOGY

## 2024-09-09 NOTE — PROGRESS NOTES
Kingston Baez cts.      Nicky Mcleod is a 24 year old female  Patient's last menstrual period was 12/10/2023 (exact date).   Chief Complaint   Patient presents with    Prenatal Care       OBSTETRICS HISTORY:     OB History    Para Term  AB Living   2 1 1 0 0 1   SAB IAB Ectopic Multiple Live Births   0 0 0 0 1      # Outcome Date GA Lbr Grayson/2nd Weight Sex Type Anes PTL Lv   2 Current            1 Term 19 37w1d 03:40 / 01:20 6 lb 14.2 oz (3.125 kg) F NORMAL SPONT None N ELENA      Birth Comments: Mother's age: 18  Maternal Blood Type: O Positive  : 1  Para: 1  Hearing Test:   Bili T: 8.80      Complications: Other - see comments       GYNE HISTORY:         No data recorded      Latest Ref Rng & Units 2024     9:56 AM   RECENT PAP RESULTS   Thinprep Pap Negative for intraepithelial lesion or malignancy Negative for intraepithelial lesion or malignancy          MEDICAL HISTORY:     Past Medical History:    Amenorrhea    irregular    Anemia complicating pregnancy in second trimester (HCC)       SURGICAL HISTORY:     No past surgical history on file.    SOCIAL HISTORY:     Social History     Socioeconomic History    Marital status:      Spouse name: Uriah    Number of children: 0    Years of education: 12    Highest education level: High school graduate   Occupational History    Occupation: unemployed   Tobacco Use    Smoking status: Never    Smokeless tobacco: Never   Vaping Use    Vaping status: Never Used   Substance and Sexual Activity    Alcohol use: Never    Drug use: Never   Other Topics Concern    Caffeine Concern No    Exercise No    Seat Belt Yes    Special Diet No    Stress Concern No     Service No    Hobby Hazards No     Social Determinants of Health     Financial Resource Strain: Low Risk  (3/26/2024)    Financial Resource Strain     Difficulty of Paying Living Expenses: Not hard at all     Med Affordability: No   Food Insecurity: No Food  Insecurity (3/26/2024)    Food Insecurity     Food Insecurity: Never true   Transportation Needs: No Transportation Needs (3/26/2024)    Transportation Needs     Lack of Transportation: No   Stress: No Stress Concern Present (3/26/2024)    Stress     Feeling of Stress : No   Housing Stability: Low Risk  (3/26/2024)    Housing Stability     Housing Instability: No        FAMILY HISTORY:     Family History   Problem Relation Age of Onset    Hypertension Mother     Diabetes Paternal Grandmother     Hypertension Paternal Grandmother     Other (Other) Maternal Grandmother     Cancer Neg        MEDICATIONS:       Current Outpatient Medications:     Ferrous Sulfate 325 (65 Fe) MG Oral Tab, Take 1 tablet (325 mg total) by mouth daily with breakfast., Disp: 30 tablet, Rfl: 5    prenatal vitamin with DHA 27-0.8-228 MG Oral Cap, Take 1 capsule by mouth daily., Disp: 90 capsule, Rfl: 0    Pyridoxine HCl 25 MG Oral Tab, Take 1 tablet (25 mg total) by mouth daily., Disp: 30 tablet, Rfl: 0    Aspirin 81 MG Oral Cap, Take 2 tablets by mouth daily. Begin taking at 12 weeks, Disp: 60 capsule, Rfl: 5    ALLERGIES:     No Known Allergies      REVIEW OF SYSTEMS:     Constitutional:    denies fever / chills  Cardiovascular:  denies chest pain or palpitations  Respiratory:    denies shortness of breath  Gastrointestinal:  denies severe abdominal pain, frequent diarrhea or constipation, nausea / vomiting  Genitourinary:    denies dysuria, bothersome incontinence  Skin/Breast:   denies any breast pain, lumps, or discharge  Neurological:    denies frequent severe headaches  Psychiatric:   denies depression or anxiety, thoughts of harming self or others      PHYSICAL EXAM:   Blood pressure 118/82, weight 134 lb 12.8 oz (61.1 kg), last menstrual period 12/10/2023, currently breastfeeding.  Constitutional:  well developed, well nourished, no distress  Abdomen:   soft, gravid, nontender  Musculoskeletal: no cva tenderness bilaterally  Skin/Hair:   no unusual rashes or bruises  Extremities:  no edema, no cyanosis, non tender bilaterally  Psychiatric:   oriented to time, place, person and situation. Appropriate mood and affect      ASSESSMENT & PLAN:     There are no diagnoses linked to this encounter.      FOLLOW-UP     No follow-ups on file.      Woody Gamez MD  9/9/2024

## 2024-09-10 ENCOUNTER — TELEPHONE (OUTPATIENT)
Dept: OBGYN CLINIC | Facility: CLINIC | Age: 24
End: 2024-09-10

## 2024-09-16 ENCOUNTER — TELEPHONE (OUTPATIENT)
Dept: OBGYN CLINIC | Facility: CLINIC | Age: 24
End: 2024-09-16

## 2024-09-16 ENCOUNTER — HOSPITAL ENCOUNTER (OUTPATIENT)
Facility: HOSPITAL | Age: 24
Discharge: HOME | End: 2024-09-16
Attending: OBSTETRICS & GYNECOLOGY | Admitting: OBSTETRICS & GYNECOLOGY
Payer: MEDICAID

## 2024-09-16 VITALS — HEART RATE: 76 BPM | SYSTOLIC BLOOD PRESSURE: 121 MMHG | DIASTOLIC BLOOD PRESSURE: 79 MMHG

## 2024-09-16 PROCEDURE — 59025 FETAL NON-STRESS TEST: CPT | Performed by: OBSTETRICS & GYNECOLOGY

## 2024-09-16 PROCEDURE — 99213 OFFICE O/P EST LOW 20 MIN: CPT | Performed by: OBSTETRICS & GYNECOLOGY

## 2024-09-16 NOTE — PROGRESS NOTES
Pt is a 24 year old female admitted to TR3/TR3-A.     Chief Complaint   Patient presents with    Leaking     Pt with c/o increased yellow discharge and decreased fetal movement.        Pt is  40w1d intra-uterine pregnancy.  History obtained, consents signed. Oriented to room, staff, and plan of care.

## 2024-09-16 NOTE — PROGRESS NOTES
[facility]  part of [facility]    History & Physical    Nicky Mcleod Patient Status:  Outpatient    2000 MRN [id number]   Location [facility] Attending Woody Gamez MD   Hosp Day # 0 PCP Mary Graf MD     Date of Admission:  2024      HPI:   Nicky Mcleod is a 24 year old  female, current EGA of 40w1d with an estimated date of delivery of: Estimated Date of Delivery: 9/15/24      Nicky Mcleod is being admitted for observation.    Her current obstetrical history is significant for anemia.    Patient reports  had some mild ctxs and mild dec fm before, white discharge but no vaginal bleeding or brown discharge.   .     Fetal Movement  sl dec with ctxs earlier.     History   Obstetric History:   OB History    Para Term  AB Living   2 1 1 0 0 1   SAB IAB Ectopic Multiple Live Births   0 0 0 0 1      # Outcome Date GA Lbr Grayson/2nd Weight Sex Type Anes PTL Lv   2 Current            1 Term 19 37w1d 03:40 / 01:20 6 lb 14.2 oz (3.125 kg) F NORMAL SPONT None N ELENA      Birth Comments: Mother's age: 18  Maternal Blood Type: O Positive  : 1  Para: 1  Hearing Test:   Bili T: 8.80      Complications: Other - see comments     Past Medical History:   Past Medical History:    Amenorrhea    irregular    Anemia complicating pregnancy in second trimester (HCC)     Past Social History: No past surgical history on file.  Family History:   Family History   Problem Relation Age of Onset    Hypertension Mother     Diabetes Paternal Grandmother     Hypertension Paternal Grandmother     Other (Other) Maternal Grandmother     Cancer Neg      Social History:   Social History     Tobacco Use    Smoking status: Never    Smokeless tobacco: Never   Substance Use Topics    Alcohol use: Never        Allergies/Medications:   Allergies:   No Known Allergies  Medications:  Medications Prior to Admission   Medication Sig  Dispense Refill Last Dose    Ferrous Sulfate 325 (65 Fe) MG Oral Tab Take 1 tablet (325 mg total) by mouth daily with breakfast. 30 tablet 5     prenatal vitamin with DHA 27-0.8-228 MG Oral Cap Take 1 capsule by mouth daily. 90 capsule 0     Pyridoxine HCl 25 MG Oral Tab Take 1 tablet (25 mg total) by mouth daily. 30 tablet 0     Aspirin 81 MG Oral Cap Take 2 tablets by mouth daily. Begin taking at 12 weeks 60 capsule 5     [] Cholecalciferol (VITAMIN D) 50 MCG (2000 UT) Oral Cap Take 1 capsule (2,000 Units total) by mouth daily. 30 capsule 5        Review of Systems:   As documented in HPI        Physical Exam:        Constitutional: alert, appears stated age, and cooperative  Respiratory: clear to auscultation bilaterally  Cardiac: regular rate and rhythm, S1, S2 normal, no murmur, click, rub or gallop  Abdomen: FHT present  Fetal Surveillance:  mod variability     Sterile vaginal exam:  pending.       Results:     Lab Results   Component Value Date    TREPONEMALAB Nonreactive 2024    ABO O 2024    RH Positive 2024    WBC 8.1 2024    HGB 13.7 2024    HCT 40.3 2024    .0 2024    CREATSERUM 0.56 2024    BUN 6 (L) 2024     2024    K 3.6 2024     2024    CO2 22.0 2024     (H) 2024    CA 8.6 (L) 2024    ALB 3.7 2024    ALKPHO 96 2024    BILT 0.4 2024    TP 6.2 2024    AST 20 2024    ALT 15 2024    LIP 35 2024       Lab Results   Component Value Date    COLORUR Yellow 2019    CLARITY Clear 2019    SPECGRAVITY 1.030 2024    PROUR 30 (A) 2019    GLUUR 250 (A) 05/10/2024    KETUR Negative 2019    BILUR Negative 2019    BLOODURINE Negative 2019    NITRITE Negative 2024    UROBILINOGEN <2.0 2019    LEUUR Moderate (A) 2019    UASA Negative 2019       No results found.      Assessment/Plan:    Nicky  Jessica Mcleod is at an estimated gestational age of 40w1d with an estimated date of delivery of:  Estimated Date of Delivery: 9/15/24    Not in labor.      Admission problem(s):  Pt noted some white vaginal discharge and irreg ctxs earllier, felt sl dec fm. During this time.  Good fm now.  Mod variability, nst ongoing at this time.  Pt counseled, spec exam pending.     Woody Gamez MD  9/16/2024  12:42 PM

## 2024-09-16 NOTE — TELEPHONE ENCOUNTER
Pt verified name and .     Prenatal pt 40w1d. Reports experiencing abdominal pain yesterday. Denies pain currently. Reports decreased fetal movement since Saturday. Reports yellow vaginal discharge with some spotting. Advised pt to go to [facility]. Pt verbalized understanding and agreed. [facility] triage nurse notified.

## 2024-09-16 NOTE — TRIAGE
[facility]  part of [facility]      Triage Note    Nicky Mcleod Patient Status:  Outpatient    2000 MRN [id number]   Location [facility] Attending Woody Gamez MD   Hosp Day # 0 PCP Mary Graf MD      Para:   Estimated Date of Delivery: 9/15/24  Gestation: 40w1d    Chief Complaint    Leaking         Allergies:  No Known Allergies    No orders of the defined types were placed in this encounter.      Lab Results   Component Value Date    WBC 8.1 2024    HGB 13.7 2024    HCT 40.3 2024    .0 2024    CREATSERUM 0.56 2024    BUN 6 (L) 2024     2024    K 3.6 2024     2024    CO2 22.0 2024     (H) 2024    CA 8.6 (L) 2024    ALB 3.7 2024    ALKPHO 96 2024    BILT 0.4 2024    TP 6.2 2024    AST 20 2024    ALT 15 2024    LIP 35 2024       Clinitek UA  Lab Results   Component Value Date    URCOLOR Yellow 05/10/2024    URCLA Cloudy (A) 05/10/2024    GLUUR 250 (A) 05/10/2024    URINEBILI Negative 05/10/2024    URINEKETONE Negative 05/10/2024    SPECGRAVITY 1.030 2024    PHUR 8.0 05/10/2024    PROTURINE Negative 05/10/2024    UROBILI <2.0 05/10/2024    URINENITRITE Negative 05/10/2024    URINELEUK Small (A) 05/10/2024    URINECUL No Growth at 18-24 hrs. 05/10/2024       UA  Lab Results   Component Value Date    COLORUR Yellow 2019    CLARITY Clear 2019    SPECGRAVITY 1.030 2024    PROUR 30 (A) 2019    GLUUR 250 (A) 05/10/2024    KETUR Negative 2019    BILUR Negative 2019    BLOODURINE Negative 2019    NITRITE Negative 2024    UROBILINOGEN <2.0 2019    LEUUR Moderate (A) 2019    UASA Negative 2019       Vitals:    24 1245   BP: 121/79   Pulse: 76       NST  Variability: Moderate           Accelerations: Yes            Decelerations: None            Baseline: 140 BPM           Uterine Irritability: No           Contractions: Irregular                                        Acoustic Stimulator: No           Nonstress Test Interpretation: Reactive           Nonstress Test Second Interpretation: Reactive          FHR Category: Category I             Additional Comments       Chief Complaint   Patient presents with    Leaking     Pt with c/o increased yellow discharge and decreased fetal movement.     Speculum exam with no pooling of fluid, negative amniotest and negative for ferning; Mucous discharge seen in vaginal vault. Pt feeling baby move and NST reactive. Pt dc hoem with labor precautions and kick counts. Pt to have appointment with office tomorrow and IOL scheduled 2000 Wednesday.         Julieth DING RN  9/16/2024 2:10 PM

## 2024-09-16 NOTE — TELEPHONE ENCOUNTER
Patient is currently 40 weeks pregnant. She is not experiencing any symptoms/contractions. She wants to know if she should schedule another Ob appointment or go to hospital. Did have some pain on 9/15; but it was very mild. Next steps were not discussed at last appointment. Please call with .

## 2024-09-17 ENCOUNTER — TELEPHONE (OUTPATIENT)
Dept: OBGYN UNIT | Facility: HOSPITAL | Age: 24
End: 2024-09-17

## 2024-09-17 ENCOUNTER — ROUTINE PRENATAL (OUTPATIENT)
Dept: OBGYN CLINIC | Facility: CLINIC | Age: 24
End: 2024-09-17
Payer: MEDICAID

## 2024-09-17 VITALS — BODY MASS INDEX: 26 KG/M2 | DIASTOLIC BLOOD PRESSURE: 78 MMHG | WEIGHT: 133.81 LBS | SYSTOLIC BLOOD PRESSURE: 117 MMHG

## 2024-09-17 DIAGNOSIS — Z34.83 ENCOUNTER FOR SUPERVISION OF OTHER NORMAL PREGNANCY IN THIRD TRIMESTER (HCC): Primary | ICD-10-CM

## 2024-09-17 PROCEDURE — 99213 OFFICE O/P EST LOW 20 MIN: CPT | Performed by: OBSTETRICS & GYNECOLOGY

## 2024-09-17 NOTE — PROGRESS NOTES
Nicky Mcleod is a 24 year old female  Patient's last menstrual period was 12/10/2023 (exact date).   Chief Complaint   Patient presents with    Prenatal Care     Good fm. Kick cts.  No c/o.  IOL 24 at 8 pm.    OBSTETRICS HISTORY:     OB History    Para Term  AB Living   2 1 1 0 0 1   SAB IAB Ectopic Multiple Live Births   0 0 0 0 1      # Outcome Date GA Lbr Grayson/2nd Weight Sex Type Anes PTL Lv   2 Current            1 Term 19 37w1d 03:40 / 01:20 6 lb 14.2 oz (3.125 kg) F NORMAL SPONT None N ELENA      Birth Comments: Mother's age: 18  Maternal Blood Type: O Positive  : 1  Para: 1  Hearing Test:   Bili T: 8.80      Complications: Other - see comments       GYNE HISTORY:         No data recorded      Latest Ref Rng & Units 2024     9:56 AM   RECENT PAP RESULTS   Thinprep Pap Negative for intraepithelial lesion or malignancy Negative for intraepithelial lesion or malignancy          MEDICAL HISTORY:     Past Medical History:    Amenorrhea    irregular    Anemia complicating pregnancy in second trimester (HCC)       SURGICAL HISTORY:     No past surgical history on file.    SOCIAL HISTORY:     Social History     Socioeconomic History    Marital status:      Spouse name: Uriah    Number of children: 0    Years of education: 12    Highest education level: High school graduate   Occupational History    Occupation: unemployed   Tobacco Use    Smoking status: Never    Smokeless tobacco: Never   Vaping Use    Vaping status: Never Used   Substance and Sexual Activity    Alcohol use: Never    Drug use: Never   Other Topics Concern    Caffeine Concern No    Exercise No    Seat Belt Yes    Special Diet No    Stress Concern No     Service No    Hobby Hazards No     Social Determinants of Health     Financial Resource Strain: Low Risk  (3/26/2024)    Financial Resource Strain     Difficulty of Paying Living Expenses: Not hard at all     Med Affordability: No   Food  Insecurity: No Food Insecurity (3/26/2024)    Food Insecurity     Food Insecurity: Never true   Transportation Needs: No Transportation Needs (3/26/2024)    Transportation Needs     Lack of Transportation: No   Stress: No Stress Concern Present (3/26/2024)    Stress     Feeling of Stress : No   Housing Stability: Low Risk  (3/26/2024)    Housing Stability     Housing Instability: No        FAMILY HISTORY:     Family History   Problem Relation Age of Onset    Hypertension Mother     Diabetes Paternal Grandmother     Hypertension Paternal Grandmother     Other (Other) Maternal Grandmother     Cancer Neg        MEDICATIONS:       Current Outpatient Medications:     prenatal vitamin with DHA 27-0.8-228 MG Oral Cap, Take 1 capsule by mouth daily., Disp: 90 capsule, Rfl: 0    Pyridoxine HCl 25 MG Oral Tab, Take 1 tablet (25 mg total) by mouth daily., Disp: 30 tablet, Rfl: 0    Aspirin 81 MG Oral Cap, Take 2 tablets by mouth daily. Begin taking at 12 weeks, Disp: 60 capsule, Rfl: 5    Ferrous Sulfate 325 (65 Fe) MG Oral Tab, Take 1 tablet (325 mg total) by mouth daily with breakfast. (Patient not taking: Reported on 9/17/2024), Disp: 30 tablet, Rfl: 5    ALLERGIES:     No Known Allergies      REVIEW OF SYSTEMS:     Constitutional:    denies fever / chills  Cardiovascular:  denies chest pain or palpitations  Respiratory:    denies shortness of breath  Gastrointestinal:  denies severe abdominal pain, frequent diarrhea or constipation, nausea / vomiting  Genitourinary:    denies dysuria, bothersome incontinence  Skin/Breast:   denies any breast pain, lumps, or discharge  Neurological:    denies frequent severe headaches  Psychiatric:   denies depression or anxiety, thoughts of harming self or others      PHYSICAL EXAM:   Blood pressure 117/78, weight 133 lb 12.8 oz (60.7 kg), last menstrual period 12/10/2023, currently breastfeeding.  Constitutional:  well developed, well nourished, no distress  Abdomen:   soft, gravid,  nontender  Musculoskeletal: no cva tenderness bilaterally  Skin/Hair:  no unusual rashes or bruises  Extremities:  no edema, no cyanosis, non tender bilaterally  Psychiatric:   oriented to time, place, person and situation. Appropriate mood and affect      ASSESSMENT & PLAN:     There are no diagnoses linked to this encounter.      FOLLOW-UP     No follow-ups on file.      Woody Gamez MD  9/17/2024

## 2024-09-18 ENCOUNTER — APPOINTMENT (OUTPATIENT)
Dept: OBGYN CLINIC | Facility: HOSPITAL | Age: 24
End: 2024-09-18
Attending: OBSTETRICS & GYNECOLOGY
Payer: MEDICAID

## 2024-09-18 ENCOUNTER — HOSPITAL ENCOUNTER (INPATIENT)
Facility: HOSPITAL | Age: 24
LOS: 1 days | Discharge: HOME | End: 2024-09-19
Attending: OBSTETRICS & GYNECOLOGY | Admitting: OBSTETRICS & GYNECOLOGY
Payer: MEDICAID

## 2024-09-18 ENCOUNTER — TELEPHONE (OUTPATIENT)
Dept: OBGYN CLINIC | Facility: CLINIC | Age: 24
End: 2024-09-18

## 2024-09-18 PROBLEM — Z34.90 PREGNANCY (HCC): Status: ACTIVE | Noted: 2024-09-18

## 2024-09-18 LAB
ANTIBODY SCREEN: NEGATIVE
BASOPHILS # BLD AUTO: 0.04 X10(3) UL (ref 0–0.2)
BASOPHILS NFR BLD AUTO: 0.6 %
DEPRECATED RDW RBC AUTO: 42.7 FL (ref 35.1–46.3)
EOSINOPHIL # BLD AUTO: 0.02 X10(3) UL (ref 0–0.7)
EOSINOPHIL NFR BLD AUTO: 0.3 %
ERYTHROCYTE [DISTWIDTH] IN BLOOD BY AUTOMATED COUNT: 13.3 % (ref 11–15)
HCT VFR BLD AUTO: 42.7 %
HGB BLD-MCNC: 14.6 G/DL
IMM GRANULOCYTES # BLD AUTO: 0.02 X10(3) UL (ref 0–1)
IMM GRANULOCYTES NFR BLD: 0.3 %
LYMPHOCYTES # BLD AUTO: 1.52 X10(3) UL (ref 1–4)
LYMPHOCYTES NFR BLD AUTO: 21.4 %
MCH RBC QN AUTO: 30 PG (ref 26–34)
MCHC RBC AUTO-ENTMCNC: 34.2 G/DL (ref 31–37)
MCV RBC AUTO: 87.9 FL
MONOCYTES # BLD AUTO: 0.62 X10(3) UL (ref 0.1–1)
MONOCYTES NFR BLD AUTO: 8.7 %
NEUTROPHILS # BLD AUTO: 4.88 X10 (3) UL (ref 1.5–7.7)
NEUTROPHILS # BLD AUTO: 4.88 X10(3) UL (ref 1.5–7.7)
NEUTROPHILS NFR BLD AUTO: 68.7 %
PLATELET # BLD AUTO: 102 10(3)UL (ref 150–450)
PLATELETS.RETICULATED NFR BLD AUTO: 22.9 % (ref 0–7)
RBC # BLD AUTO: 4.86 X10(6)UL
RH BLOOD TYPE: POSITIVE
T PALLIDUM AB SER QL IA: NONREACTIVE
WBC # BLD AUTO: 7.1 X10(3) UL (ref 4–11)

## 2024-09-18 PROCEDURE — 59409 OBSTETRICAL CARE: CPT | Performed by: OBSTETRICS & GYNECOLOGY

## 2024-09-18 RX ORDER — ACETAMINOPHEN 500 MG
1000 TABLET ORAL EVERY 6 HOURS PRN
Status: DISCONTINUED | OUTPATIENT
Start: 2024-09-18 | End: 2024-09-20

## 2024-09-18 RX ORDER — SODIUM CHLORIDE, SODIUM LACTATE, POTASSIUM CHLORIDE, CALCIUM CHLORIDE 600; 310; 30; 20 MG/100ML; MG/100ML; MG/100ML; MG/100ML
INJECTION, SOLUTION INTRAVENOUS AS NEEDED
Status: DISCONTINUED | OUTPATIENT
Start: 2024-09-18 | End: 2024-09-18 | Stop reason: HOSPADM

## 2024-09-18 RX ORDER — ONDANSETRON 2 MG/ML
4 INJECTION INTRAMUSCULAR; INTRAVENOUS EVERY 6 HOURS PRN
Status: DISCONTINUED | OUTPATIENT
Start: 2024-09-18 | End: 2024-09-18 | Stop reason: HOSPADM

## 2024-09-18 RX ORDER — CITRIC ACID/SODIUM CITRATE 334-500MG
30 SOLUTION, ORAL ORAL AS NEEDED
Status: DISCONTINUED | OUTPATIENT
Start: 2024-09-18 | End: 2024-09-18 | Stop reason: HOSPADM

## 2024-09-18 RX ORDER — ACETAMINOPHEN 500 MG
500 TABLET ORAL EVERY 6 HOURS PRN
Status: DISCONTINUED | OUTPATIENT
Start: 2024-09-18 | End: 2024-09-18

## 2024-09-18 RX ORDER — LIDOCAINE HYDROCHLORIDE 10 MG/ML
30 INJECTION, SOLUTION EPIDURAL; INFILTRATION; INTRACAUDAL; PERINEURAL ONCE
Status: DISCONTINUED | OUTPATIENT
Start: 2024-09-18 | End: 2024-09-18 | Stop reason: HOSPADM

## 2024-09-18 RX ORDER — ACETAMINOPHEN 500 MG
1000 TABLET ORAL EVERY 6 HOURS PRN
Status: DISCONTINUED | OUTPATIENT
Start: 2024-09-18 | End: 2024-09-18 | Stop reason: HOSPADM

## 2024-09-18 RX ORDER — DOCUSATE SODIUM 100 MG/1
100 CAPSULE, LIQUID FILLED ORAL
Status: DISCONTINUED | OUTPATIENT
Start: 2024-09-18 | End: 2024-09-20

## 2024-09-18 RX ORDER — AMMONIA INHALANTS 0.04 G/.3ML
0.3 INHALANT RESPIRATORY (INHALATION) AS NEEDED
Status: DISCONTINUED | OUTPATIENT
Start: 2024-09-18 | End: 2024-09-20

## 2024-09-18 RX ORDER — BISACODYL 10 MG
10 SUPPOSITORY, RECTAL RECTAL ONCE AS NEEDED
Status: DISCONTINUED | OUTPATIENT
Start: 2024-09-18 | End: 2024-09-20

## 2024-09-18 RX ORDER — DEXTROSE, SODIUM CHLORIDE, SODIUM LACTATE, POTASSIUM CHLORIDE, AND CALCIUM CHLORIDE 5; .6; .31; .03; .02 G/100ML; G/100ML; G/100ML; G/100ML; G/100ML
INJECTION, SOLUTION INTRAVENOUS CONTINUOUS
Status: DISCONTINUED | OUTPATIENT
Start: 2024-09-18 | End: 2024-09-18 | Stop reason: HOSPADM

## 2024-09-18 RX ORDER — IBUPROFEN 600 MG/1
600 TABLET, FILM COATED ORAL EVERY 6 HOURS
Status: DISCONTINUED | OUTPATIENT
Start: 2024-09-18 | End: 2024-09-20

## 2024-09-18 RX ORDER — ACETAMINOPHEN 500 MG
500 TABLET ORAL EVERY 6 HOURS PRN
Status: DISCONTINUED | OUTPATIENT
Start: 2024-09-18 | End: 2024-09-20

## 2024-09-18 RX ORDER — TERBUTALINE SULFATE 1 MG/ML
0.25 INJECTION, SOLUTION SUBCUTANEOUS AS NEEDED
Status: DISCONTINUED | OUTPATIENT
Start: 2024-09-18 | End: 2024-09-18 | Stop reason: HOSPADM

## 2024-09-18 RX ORDER — SIMETHICONE 80 MG
80 TABLET,CHEWABLE ORAL 3 TIMES DAILY PRN
Status: DISCONTINUED | OUTPATIENT
Start: 2024-09-18 | End: 2024-09-20

## 2024-09-18 RX ORDER — IBUPROFEN 600 MG/1
600 TABLET, FILM COATED ORAL ONCE AS NEEDED
Status: DISCONTINUED | OUTPATIENT
Start: 2024-09-18 | End: 2024-09-18 | Stop reason: HOSPADM

## 2024-09-18 NOTE — L&D DELIVERY NOTE
[facility]  part of [facility]    Vaginal Delivery Note    Nicky Mcleod Patient Status:  Inpatient    2000 MRN [id number]   Location [facility] Attending Woody Gamez MD   Hosp Day # 0 PCP Mary Graf MD     Delivery     Infant  Date of Delivery: 2024    Time of Delivery: 3:46 PM   Delivery Type: Normal spontaneous vaginal delivery     Infant Sex/Birthweight: female 7 lb 12.2 oz (3.52 kg)     Presentation Vertex [1]   Position Left [1]  Occiput [1]  Anterior [1]     Apgars:  1 minute: 8                5 minutes: 9                         10 minutes:      Placenta  Date/Time of Delivery: 2024  3:49 PM    Delivery: spontaneous  Placenta to Pathology: no  Cord Gases Submitted: no  Cord Blood Collection: yes  Cord Tissue Collection: no  Cord Complications: none  Sponge and Needle Counts:  Verified yes    Maternal Anesthesia: none   Episiotomy/Laceration Repair  Laceration: none    Delivery Complications  none    Neonatologist Present: no  Delivery Comment: infant pink crying and active.       Intake/Output   EBL:  100 ml      Woody Gamez MD   2024  5:12 PM

## 2024-09-18 NOTE — TELEPHONE ENCOUNTER
Americans with Disabilities Act forms received and logged for processing - Form Completion Request/RAFAT received with forms - per RAFAT patient to  forms.

## 2024-09-18 NOTE — TELEPHONE ENCOUNTER
Uzbek only  Patient has Cytotec appointment tonight at 8pm, but is having pain every 5-min.    Pls advise

## 2024-09-18 NOTE — TELEPHONE ENCOUNTER
Patient verified name and     Patient 40w3d has been experiencing contractions for about an hour every 5 minutes continuously. States contractions have been lasting for about 30 seconds. Reports fetal movement and some spotting. Patient scheduled for induction tonight. Patient informed for [facility]. Patient agreed. Nurse at [facility] notified.

## 2024-09-18 NOTE — PROGRESS NOTES
[facility]  part of [facility]    History & Physical    Nicky Mcleod Patient Status:  Outpatient    2000 MRN [id number]   Location [facility] Attending Woody Gamez MD   Hosp Day # 0 PCP Mary Graf MD     Date of Admission:  2024      HPI:   Nicky Mcleod is a 24 year old  female, current EGA of 40w3d with an estimated date of delivery of: Estimated Date of Delivery: 9/15/24      Nicky Mcleod is being admitted for observation.    Her current obstetrical history is significant for anemia.    Patient reports no complaints and good fetal movement .     Fetal Movement: normal.     History   Obstetric History:   OB History    Para Term  AB Living   2 1 1 0 0 1   SAB IAB Ectopic Multiple Live Births   0 0 0 0 1      # Outcome Date GA Lbr Grayson/2nd Weight Sex Type Anes PTL Lv   2 Current            1 Term 19 37w1d 03:40 / 01:20 6 lb 14.2 oz (3.125 kg) F NORMAL SPONT None N ELENA      Birth Comments: Mother's age: 18  Maternal Blood Type: O Positive  : 1  Para: 1  Hearing Test:   Bili T: 8.80      Complications: Other - see comments     Past Medical History:   Past Medical History:    Amenorrhea    irregular    Anemia complicating pregnancy in second trimester (HCC)     Past Social History: No past surgical history on file.  Family History:   Family History   Problem Relation Age of Onset    Hypertension Mother     Diabetes Paternal Grandmother     Hypertension Paternal Grandmother     Other (Other) Maternal Grandmother     Cancer Neg      Social History:   Social History     Tobacco Use    Smoking status: Never    Smokeless tobacco: Never   Substance Use Topics    Alcohol use: Never        Allergies/Medications:   Allergies:   No Known Allergies  Medications:  Medications Prior to Admission   Medication Sig Dispense Refill Last Dose    Ferrous Sulfate 325 (65 Fe) MG Oral Tab Take 1 tablet (325  mg total) by mouth daily with breakfast. (Patient not taking: Reported on 2024) 30 tablet 5     prenatal vitamin with DHA 27-0.8-228 MG Oral Cap Take 1 capsule by mouth daily. 90 capsule 0     Pyridoxine HCl 25 MG Oral Tab Take 1 tablet (25 mg total) by mouth daily. 30 tablet 0     Aspirin 81 MG Oral Cap Take 2 tablets by mouth daily. Begin taking at 12 weeks 60 capsule 5     [] Cholecalciferol (VITAMIN D) 50 MCG (2000 UT) Oral Cap Take 1 capsule (2,000 Units total) by mouth daily. 30 capsule 5        Review of Systems:   As documented in HPI    no complaints and good fetal movement    Physical Exam:        Constitutional: alert, appears stated age, and cooperative  Respiratory: clear to auscultation bilaterally  Cardiac: regular rate and rhythm, S1, S2 normal, no murmur, click, rub or gallop  Abdomen: FHT present  Fetal Surveillance:  pending.    Sterile vaginal exam:  6cm       Results:     Lab Results   Component Value Date    TREPONEMALAB Nonreactive 2024    ABO O 2024    RH Positive 2024    WBC 8.1 2024    HGB 13.7 2024    HCT 40.3 2024    .0 2024    CREATSERUM 0.56 2024    BUN 6 (L) 2024     2024    K 3.6 2024     2024    CO2 22.0 2024     (H) 2024    CA 8.6 (L) 2024    ALB 3.7 2024    ALKPHO 96 2024    BILT 0.4 2024    TP 6.2 2024    AST 20 2024    ALT 15 2024    LIP 35 2024       Lab Results   Component Value Date    COLORUR Yellow 2019    CLARITY Clear 2019    SPECGRAVITY 1.030 2024    PROUR 30 (A) 2019    GLUUR 250 (A) 05/10/2024    KETUR Negative 2019    BILUR Negative 2019    BLOODURINE Negative 2019    NITRITE Negative 2024    UROBILINOGEN <2.0 2019    LEUUR Moderate (A) 2019    UASA Negative 2019       No results found.      Assessment/Plan:    Nicky Mcleod is  at an estimated gestational age of 40w3d with an estimated date of delivery of:  Estimated Date of Delivery: 9/15/24    Active phase labor.  Obstetrical history significant for anemia.    Admission problem(s):   Gbs pos, amp ordered.  Counseled, all questions answered.         Risks, benefits, alternatives and possible complications have been discussed in detail with the patient.   Pre-admission, admission, and post admission procedures and expectations were discussed in detail.    All questions answered, all appropriate consents will be signed at the Hospital. Admission is planned for today.   Continue present management..    Woody Gamez MD  9/18/2024  12:20 PM

## 2024-09-19 VITALS
SYSTOLIC BLOOD PRESSURE: 109 MMHG | HEIGHT: 60 IN | TEMPERATURE: 98 F | BODY MASS INDEX: 26.31 KG/M2 | RESPIRATION RATE: 16 BRPM | HEART RATE: 70 BPM | DIASTOLIC BLOOD PRESSURE: 74 MMHG | WEIGHT: 134 LBS

## 2024-09-19 LAB
BASOPHILS # BLD AUTO: 0.05 X10(3) UL (ref 0–0.2)
BASOPHILS NFR BLD AUTO: 0.5 %
DEPRECATED RDW RBC AUTO: 42 FL (ref 35.1–46.3)
EOSINOPHIL # BLD AUTO: 0.02 X10(3) UL (ref 0–0.7)
EOSINOPHIL NFR BLD AUTO: 0.2 %
ERYTHROCYTE [DISTWIDTH] IN BLOOD BY AUTOMATED COUNT: 13.3 % (ref 11–15)
HCT VFR BLD AUTO: 38.4 %
HGB BLD-MCNC: 13.2 G/DL
IMM GRANULOCYTES # BLD AUTO: 0.02 X10(3) UL (ref 0–1)
IMM GRANULOCYTES NFR BLD: 0.2 %
LYMPHOCYTES # BLD AUTO: 1.88 X10(3) UL (ref 1–4)
LYMPHOCYTES NFR BLD AUTO: 20.4 %
MCH RBC QN AUTO: 29.9 PG (ref 26–34)
MCHC RBC AUTO-ENTMCNC: 34.4 G/DL (ref 31–37)
MCV RBC AUTO: 87.1 FL
MONOCYTES # BLD AUTO: 0.8 X10(3) UL (ref 0.1–1)
MONOCYTES NFR BLD AUTO: 8.7 %
NEUTROPHILS # BLD AUTO: 6.45 X10 (3) UL (ref 1.5–7.7)
NEUTROPHILS # BLD AUTO: 6.45 X10(3) UL (ref 1.5–7.7)
NEUTROPHILS NFR BLD AUTO: 70 %
PLATELET # BLD AUTO: 110 10(3)UL (ref 150–450)
PLATELETS.RETICULATED NFR BLD AUTO: 17.8 % (ref 0–7)
RBC # BLD AUTO: 4.41 X10(6)UL
WBC # BLD AUTO: 9.2 X10(3) UL (ref 4–11)

## 2024-09-19 NOTE — DISCHARGE SUMMARY
[facility]  part of [facility]    OB/GYNE Postpartum Discharge Summary      Nicky Mcleod Patient Status:  Inpatient    2000 MRN [id number]   Location [facility] 3SE Attending Woody Gamez MD   Hosp Day # 1 PCP Mary Graf MD              Date:     2024    Patient:  Nicky Mcleod       :     2000  Age:      24 year old      Gender:  female  MRN:   [id number]  Admit Date:  2024   Discharge Date:  2024     Subjective :  The patient 24 year old y/o  is Postpartum day #1 from a vaginal delivery.  She is doing well.  Lochia normal.  Pain controlled.  Breastfeeding without difficulty.       Objective   Physical Exam:  /74 (BP Location: Right arm)   Pulse 70   Temp 98.2 °F (36.8 °C) (Oral)   Resp 16   Ht 5' (1.524 m)   Wt 134 lb (60.8 kg)   LMP 12/10/2023 (Exact Date)   Breastfeeding Yes   BMI 26.17 kg/m²     Intake/Output Summary (Last 24 hours) at 2024 1134  Last data filed at 2024 0100  Gross per 24 hour   Intake --   Output 900 ml   Net -900 ml     Abdomen - soft, ND, NT  Fundus -firm, NT  Lower Extremities - NT    Result Data:  Lab Results   Component Value Date    WBC 9.2 2024    RBC 4.41 2024    HGB 13.2 2024    HCT 38.4 2024    .0 (L) 2024     (H) 2024    BUN 6 (L) 2024     2024    K 3.6 2024     2024    CA 8.6 (L) 2024    CO2 22.0 2024     Abnormal Labs Reviewed   CBC WITH DIFFERENTIAL WITH PLATELET - Abnormal; Notable for the following components:       Result Value    .0 (*)     Immature Platelet Fraction 22.9 (*)     All other components within normal limits   CBC WITH DIFFERENTIAL WITH PLATELET - Abnormal; Notable for the following components:    .0 (*)     Immature Platelet Fraction 17.8 (*)     All other components within normal limits                Assessment and Plan:     Active Problems:    Normal delivery at term (HCC)        PPD # 1  Cont PP care.  Ambulate.  Discharge home - f/u 2-3 wks for PP visit  Discharge Condition - Stable   Call office if heavy bleeding soaking a pad in <1hr, fever, problems breastfeeding or any depression issues.      Ami Dao MD

## 2024-09-19 NOTE — CM/SW NOTE
SDOH for pt does not have a crib.  SW met w/pt and she states that she has a safe sleeping space for her child which is a bassinet.    Bette HERNÁNDEZ MSW, LSW  Social Work/Case Management

## 2024-09-19 NOTE — DISCHARGE INSTRUCTIONS
Pelvic rest for 6 weeks. No sex, tampons, nothing in the vagina. No sex, tampons, hot tubs or jacuzzis.  Call Md for any questions or concerns including: temp over 100.4, increased pain, increased bleeding or any signs of postpartum depression.     [facility] has great support for our families even after discharge.  We have virtual or in-person support groups.  Visit our website for the most up-to-date info for our many different support groups. [email or web address]/       Outpatient Lactation appoints.  Call [phone]- to schedule an appt.  Our office is located in the Maternal Fetal Medicine office next to [facility] on the first floor.      New Moms Support Groups  Our weekly New Mom Support Groups are for any new parents in our community. They are led by an experienced Mother/Baby nurse or IBCLC and usually include a guest speaker on a topic of interest to new parents. These in-person groups also include Breastfeeding Support at each meeting. Bring your baby ( - 6 months) with you! Moms-to-be are also welcome! All mom's welcome even if its not your first.     MOM & BABY HOUR   Meets most  10:00 - 11:30 a.m.  Masks are not required, but be considerate of others and do not attend if mom or baby have had any symptoms of illness within the previous 24 hours. Breastfeeding support will be included at each session--just ask the leader any breastfeeding questions you may have. Location [facility] [place] [address] [place] Go inside the front door and to the right to the “Community Education Room”.    Mom's Line: [phone]   This service is provided by [facility], has a phone line dedicated for women (or anyone worried about a women) who may be experiencing signs or symptoms of postpartum depression.    Nurturing Mom- A support group for new and expectant moms looking for  support with the transition to parenthood as well as those experiencing symptoms of  anxiety and/or depression.  Please contact [email or web address] if you need directions or the link for the virtual meetings. Please contact [email or web address] if you plan to attend, but please be considerate of others and do not attend if mom or baby have had any symptoms of illness within the previous 24 hours.     La Leche League for breast feeding and parent support, Website: IIIus.org  and for the [place] group and other groups visit [email or web address].com/pg/Josias/events/.  to help find a group, all meetings are virtual.     Facebook groups-  for more support when home- [facility] --- you can find mom-to-mom advice and the list of speaker topics for cradle talk program.     Helpful websites:    www.llli.org  www.[employer]  www.Breastfeed[place].org

## 2024-09-20 NOTE — PROGRESS NOTES
Discharge instructions reviewed with patient and sister. Pt verbalized understanding of signs and symptoms of postpartum depression, hemorrhage, infection, and pre-eclampsia and when to call provider. ID bands verified. Patient to follow-up in 3 weeks.  Pt escorted off unit in stable condition with infant in car seat in her arms.

## 2024-09-20 NOTE — PLAN OF CARE
Problem: POSTPARTUM  Goal: Long Term Goal:Experiences normal postpartum course  Description: INTERVENTIONS:  - Assess and monitor vital signs and lab values.  - Assess fundus and lochia.  - Provide ice/sitz baths for perineum discomfort.  - Monitor healing of incision/episiotomy/laceration, and assess for signs and symptoms of infection and hematoma.  - Assess bladder function and monitor for bladder distention.  - Provide/instruct/assist with pericare as needed.  - Provide VTE prophylaxis as needed.  - Monitor bowel function.  - Encourage ambulation and provide assistance as needed.  - Assess and monitor emotional status and provide social service/psych resources as needed.  - Utilize standard precautions and use personal protective equipment as indicated. Ensure aseptic care of all intravenous lines and invasive tubes/drains.  - Obtain immunization and exposure to communicable diseases history.  9/19/2024 1950 by Miranda Baldwin, RN  Outcome: Completed  9/19/2024 [facility] by Miranda Baldwin, RN  Outcome: Progressing     Problem: POSTPARTUM  Goal: Optimize infant feeding at the breast  Description: INTERVENTIONS:  - Initiate breast feeding within first hour after birth.   - Monitor effectiveness of current breast feeding efforts.  - Assess support systems available to mother/family.  - Identify cultural beliefs/practices regarding lactation, letdown techniques, maternal food preferences.  - Assess mother's knowledge and previous experience with breast feeding.  - Provide information as needed about early infant feeding cues (e.g., rooting, lip smacking, sucking fingers/hand) versus late cue of crying.  - Discuss/demonstrate breast feeding aids (e.g., infant sling, nursing footstool/pillows, and breast pumps).  - Encourage mother/other family members to express feelings/concerns, and actively listen.  - Educate father/SO about benefits of breast feeding and how to manage common lactation challenges.  -  Recommend avoidance of specific medications or substances incompatible with breast feeding.  - Assess and monitor for signs of nipple pain/trauma.  - Instruct and provide assistance with proper latch.  - Review techniques for milk expression (breast pumping) and storage of breast milk. Provide pumping equipment/supplies, instructions and assistance, as needed.  - Encourage rooming-in and breast feeding on demand.  - Encourage skin-to-skin contact.  - Provide LC support as needed.  - Assess for and manage engorgement.  - Provide breast feeding education handouts and information on community breast feeding support.   2024 by Miranda Baldwin RN  Outcome: Completed  2024 1037 by Miranda Baldwin RN  Outcome: Progressing     Problem: POSTPARTUM  Goal: Establishment of adequate milk supply with medication/procedure interruptions  Description: INTERVENTIONS:  - Review techniques for milk expression (breast pumping).   - Provide pumping equipment/supplies, instructions, and assistance until it is safe to breastfeed infant.  2024 by Miranda Baldwin RN  Outcome: Completed  2024 1037 by Miranda Baldwin RN  Outcome: Progressing     Problem: POSTPARTUM  Goal: Appropriate maternal -  bonding  Description: INTERVENTIONS:  - Assess caregiver- interactions.  - Assess caregiver's emotional status and coping mechanisms.  - Encourage caregiver to participate in  daily care.  - Assess support systems available to mother/family.  - Provide /case management support as needed.  2024 by Miranda Baldwin, RN  Outcome: Completed  2024 by Miranda Baldwin RN  Outcome: Progressing

## 2024-09-25 NOTE — TELEPHONE ENCOUNTER
Dr.San Stark,    Please sign off on form if you agree to: Americans with Disabilities Act due to Maternity leave. Start date 09/18/24-12wks    -Signature page will be the first page scanned  -From your Inbasket, Highlight the patient and click Chart   -Double click the 09/10/2024 Forms Completion telephone encounter  -Scroll down to the Media section   -Click the blue Hyperlink: Americans with Disabilities Act Dr Gamez 09/25/24  -Click Acknowledge located in the top right ribbon/menu   -Drag the mouse into the blank space of the document and a + sign will appear. Left click to   electronically sign the document.  -Once signed, simply exit out of the screen and you signature will be saved.     Thank you,    Norma

## 2024-09-25 NOTE — TELEPHONE ENCOUNTER
Called patient to obtain details. Patient seeking Americans with Disabilities Act maternity leave. Start date 09/18/24-12wks maternity leave.

## 2024-09-29 RX ORDER — CHOLECALCIFEROL (VITAMIN D3) 25 MCG
1 TABLET,CHEWABLE ORAL DAILY
Qty: 90 CAPSULE | Refills: 3 | Status: SHIPPED | OUTPATIENT
Start: 2024-09-29

## 2024-09-29 NOTE — TELEPHONE ENCOUNTER
Protocol Passed    LOV:03/05/2024  RTC:n/a  Labs:n/a  Last Filled: n/a  Future Appointments   Date Time Provider Department Center   10/9/2024 10:40 AM Woody Gamez MD [facility] [place]

## 2024-09-30 NOTE — TELEPHONE ENCOUNTER
Called patient to inform Americans with Disabilities Act has been completed. Patient can  a copy at providers office. Patient verbalized understanding and stated she will  a copy today at her appointment.

## 2024-10-09 ENCOUNTER — POSTPARTUM (OUTPATIENT)
Dept: OBGYN CLINIC | Facility: CLINIC | Age: 24
End: 2024-10-09
Payer: MEDICAID

## 2024-10-09 VITALS
HEIGHT: 60 IN | DIASTOLIC BLOOD PRESSURE: 75 MMHG | BODY MASS INDEX: 23.29 KG/M2 | WEIGHT: 118.63 LBS | SYSTOLIC BLOOD PRESSURE: 110 MMHG

## 2024-10-09 NOTE — PROGRESS NOTES
HPI:   Nicky Mcleod is a 24 year old female who presents for a pp visit.  Pt used nexplanon per pt in the past, and wants another nexplanon in 3 weeks.   To schedule with psr.    Wt Readings from Last 6 Encounters:   10/09/24 118 lb 9.6 oz (53.8 kg)   09/18/24 134 lb (60.8 kg)   09/17/24 133 lb 12.8 oz (60.7 kg)   09/09/24 134 lb 12.8 oz (61.1 kg)   09/03/24 134 lb (60.8 kg)   08/26/24 133 lb (60.3 kg)     Body mass index is 23.16 kg/m².    AST (U/L)   Date Value   07/16/2024 20   03/26/2024 9   03/11/2024 14     ALT (U/L)   Date Value   07/16/2024 15   03/26/2024 7 (L)   03/11/2024 <7 (L)        Current Outpatient Medications   Medication Sig Dispense Refill    prenatal vitamin with DHA 27-0.8-228 MG Oral Cap Take 1 capsule by mouth daily. 90 capsule 3    Ferrous Sulfate 325 (65 Fe) MG Oral Tab Take 1 tablet (325 mg total) by mouth daily with breakfast. 30 tablet 5      Past Medical History:    Amenorrhea    irregular    Anemia complicating pregnancy in second trimester (HCC)      No past surgical history on file.   Family History   Problem Relation Age of Onset    Hypertension Mother     Diabetes Paternal Grandmother     Hypertension Paternal Grandmother     Other (Other) Maternal Grandmother     Cancer Neg       Social History:   Social History     Socioeconomic History    Marital status:      Spouse name: Uriah    Number of children: 0    Years of education: 12    Highest education level: High school graduate   Occupational History    Occupation: unemployed   Tobacco Use    Smoking status: Never    Smokeless tobacco: Never   Vaping Use    Vaping status: Never Used   Substance and Sexual Activity    Alcohol use: Never    Drug use: Never   Other Topics Concern    Caffeine Concern No    Exercise No    Seat Belt Yes    Special Diet No    Stress Concern No     Service No    Hobby Hazards No     Social Drivers of Health     Financial Resource Strain: Low Risk  (9/18/2024)    Financial  Resource Strain     Difficulty of Paying Living Expenses: Not hard at all     Med Affordability: No   Food Insecurity: No Food Insecurity (9/18/2024)    Food Insecurity     Food Insecurity: Never true   Transportation Needs: No Transportation Needs (9/18/2024)    Transportation Needs     Lack of Transportation: No     Car Seat: Yes   Stress: No Stress Concern Present (9/18/2024)    Stress     Feeling of Stress : No   Housing Stability: Medium Risk (9/18/2024)    Housing Stability     Housing Instability: No     Crib or Bassinette: No            REVIEW OF SYSTEMS:   GENERAL: feels well otherwise  SKIN: denies any unusual skin lesions  EYES:denies blurred vision or double vision  HEENT: denies nasal congestion, sinus pain or ST  LUNGS: denies shortness of breath with exertion  CARDIOVASCULAR: denies chest pain on exertion  GI: denies abdominal pain,denies heartburn  : denies dysuria, vaginal discharge or itching,periods regular   MUSCULOSKELETAL: denies back pain  NEURO: denies headaches  PSYCHE: denies depression or anxiety  HEMATOLOGIC: denies hx of anemia  ENDOCRINE: denies thyroid history  ALL/ASTHMA: denies hx of allergy or asthma    EXAM:   /75   Ht 5' (1.524 m)   Wt 118 lb 9.6 oz (53.8 kg)   LMP 12/10/2023 (Exact Date)   Breastfeeding Yes   BMI 23.16 kg/m²   Body mass index is 23.16 kg/m².   GENERAL: well developed, well nourished,in no apparent distress  SKIN: no rashes,no suspicious lesions  HEENT: atraumatic, normocephalic  EYES:normal in appearance  NECK: supple,no adenopathy  CHEST: no chest tenderness  BREAST:def   LUNGS: clear to auscultation  CARDIO: RRR without murmur  GI: good BS's,no masses, HSM or tenderness  :introitus is normal,scant discharge,cervix is pink,no adnexal masses or tenderness    MUSCULOSKELETAL: back is not tender,FROM of the back  EXTREMITIES: no cyanosis, clubbing or edema  NEURO: Oriented times three      ASSESSMENT AND PLAN:   Nicky Mcleod is a 24 year old  female who presents for a pp visit.  . Self breast exam explained. Health maintenance. Body mass index is 23.16 kg/m²., recommended low fat diet and aerobic exercise 30 minutes three times weekly.  The patient indicates understanding of these issues and agrees to the plan.  The patient is asked to return for an annual visit.

## 2024-10-17 ENCOUNTER — OFFICE VISIT (OUTPATIENT)
Dept: OBGYN CLINIC | Facility: CLINIC | Age: 24
End: 2024-10-17
Payer: MEDICAID

## 2024-10-17 VITALS
SYSTOLIC BLOOD PRESSURE: 112 MMHG | DIASTOLIC BLOOD PRESSURE: 76 MMHG | BODY MASS INDEX: 23.16 KG/M2 | WEIGHT: 118 LBS | HEIGHT: 59.84 IN

## 2024-10-17 DIAGNOSIS — Z32.00 PREGNANCY EXAMINATION OR TEST, PREGNANCY UNCONFIRMED: Primary | ICD-10-CM

## 2024-10-17 DIAGNOSIS — Z30.017 INSERTION OF IMPLANTABLE SUBDERMAL CONTRACEPTIVE: ICD-10-CM

## 2024-10-17 LAB
CONTROL LINE PRESENT WITH A CLEAR BACKGROUND (YES/NO): YES YES/NO
KIT LOT #: NORMAL NUMERIC
PREGNANCY TEST, URINE: NEGATIVE

## 2024-10-17 PROCEDURE — 11981 INSERTION DRUG DLVR IMPLANT: CPT | Performed by: STUDENT IN AN ORGANIZED HEALTH CARE EDUCATION/TRAINING PROGRAM

## 2024-10-17 PROCEDURE — 81025 URINE PREGNANCY TEST: CPT | Performed by: STUDENT IN AN ORGANIZED HEALTH CARE EDUCATION/TRAINING PROGRAM

## 2024-10-17 NOTE — PROGRESS NOTES
Nexplanon Insertion    Pregnancy Results: negative from urine test   Birth control method(s) used:  none  Consent was obtained from the patient.  Pt counseled on side effects of Nexplanon including irregular bleeding, skin changes, weight changes, mood changes etc.  Discussed risks of Nexplanon insertion and use.    Insertion:  The patient was positioned with her left arm flexed.    Measurement was taken from her epicondyle approximately 8 cm and marked 5 cm apart from the orginal adarsh.  1% lidocaine WITHOUT epinephrine  was used to inject the planned insertion site.  Device opened, davina confirmed within device.  Lateral traction of skin performed while Nexplanon inserted.  The Nexplanon was placed 8 cm from the epicondyle without difficulty.    The ridged portion of the applicator was seen once the device was withdrawn.      Visit Plan:  Steri-Strips were applied to the skin incision and it was covered with a band-aid.  A bandage was wrapped around the injected arm.  Both Physician and pt confirmed device by tactile feel.  Patient was instructed to remove  bandage in 24 hours.  Patient was instructed to remove Steri-Strips and band-aid in 5 days.  All of the patient's questions were addressed.  Nexplanon info card was given to the patient with expiration 2027

## 2024-10-19 ENCOUNTER — TELEPHONE (OUTPATIENT)
Dept: OBGYN UNIT | Facility: HOSPITAL | Age: 24
End: 2024-10-19

## 2024-11-14 ENCOUNTER — OFFICE VISIT (OUTPATIENT)
Dept: FAMILY MEDICINE CLINIC | Facility: CLINIC | Age: 24
End: 2024-11-14

## 2024-11-14 VITALS
SYSTOLIC BLOOD PRESSURE: 118 MMHG | BODY MASS INDEX: 24.6 KG/M2 | HEART RATE: 82 BPM | DIASTOLIC BLOOD PRESSURE: 81 MMHG | HEIGHT: 59 IN | WEIGHT: 122 LBS

## 2024-11-14 DIAGNOSIS — L30.9 DERMATITIS: Primary | ICD-10-CM

## 2024-11-14 PROCEDURE — 99213 OFFICE O/P EST LOW 20 MIN: CPT | Performed by: PHYSICIAN ASSISTANT

## 2024-11-14 RX ORDER — BETAMETHASONE DIPROPIONATE 0.5 MG/G
1 CREAM TOPICAL 2 TIMES DAILY
Qty: 15 G | Refills: 0 | Status: SHIPPED | OUTPATIENT
Start: 2024-11-14 | End: 2024-11-15

## 2024-11-14 NOTE — PROGRESS NOTES
HPI:     HPI  A 24-year-old female is in the office complaining of rashes on her hands for the past month. The rash is itchy. She applies lotion, which makes it worsen. She denies redness, swelling, pain, new medication, or shampoo.      Medications:     Current Outpatient Medications   Medication Sig Dispense Refill    betamethasone dipropionate 0.05 % External Cream Apply 1 Application topically 2 (two) times daily. 15 g 0    prenatal vitamin with DHA 27-0.8-228 MG Oral Cap Take 1 capsule by mouth daily. 90 capsule 3    Ferrous Sulfate 325 (65 Fe) MG Oral Tab Take 1 tablet (325 mg total) by mouth daily with breakfast. 30 tablet 5       Allergies:   Allergies[1]    History:     Health Maintenance   Topic Date Due    Annual Physical  Never done    HPV Vaccines (1 - 3-dose series) Never done    COVID-19 Vaccine (1 - 2024-25 season) Never done    Influenza Vaccine (1) Never done    Chlamydia Screening  04/02/2025    Pap Smear  04/02/2027    DTaP,Tdap,and Td Vaccines (3 - Td or Tdap) 07/15/2034    Annual Depression Screening  Completed    Pneumococcal Vaccine: Birth to 64yrs  Aged Out       Patient's last menstrual period was 12/10/2023 (exact date).   Past Medical History:     Past Medical History:    Amenorrhea    irregular    Anemia complicating pregnancy in second trimester (HCC)       Past Surgical History:   History reviewed. No pertinent surgical history.    Family History:     Family History   Problem Relation Age of Onset    Hypertension Mother     Diabetes Paternal Grandmother     Hypertension Paternal Grandmother     Other (Other) Maternal Grandmother     Cancer Neg        Social History:     Social History     Socioeconomic History    Marital status:      Spouse name: Uriah    Number of children: 0    Years of education: 12    Highest education level: High school graduate   Occupational History    Occupation: unemployed   Tobacco Use    Smoking status: Never    Smokeless tobacco: Never   Vaping Use     Vaping status: Never Used   Substance and Sexual Activity    Alcohol use: Never    Drug use: Never    Sexual activity: Not on file   Other Topics Concern    Caffeine Concern No    Exercise No    Seat Belt Yes    Special Diet No    Stress Concern No    Weight Concern Not Asked     Service No    Blood Transfusions Not Asked    Occupational Exposure Not Asked    Hobby Hazards No    Sleep Concern Not Asked    Back Care Not Asked    Bike Helmet Not Asked    Self-Exams Not Asked   Social History Narrative    Not on file     Social Drivers of Health     Financial Resource Strain: Low Risk  (9/18/2024)    Financial Resource Strain     Difficulty of Paying Living Expenses: Not hard at all     Med Affordability: No   Food Insecurity: No Food Insecurity (9/18/2024)    Food Insecurity     Food Insecurity: Never true   Transportation Needs: No Transportation Needs (9/18/2024)    Transportation Needs     Lack of Transportation: No     Car Seat: Yes   Stress: No Stress Concern Present (9/18/2024)    Stress     Feeling of Stress : No   Housing Stability: Medium Risk (9/18/2024)    Housing Stability     Housing Instability: No     Housing Instability Emergency: Not on file     Crib or Bassinette: No       Review of Systems:   Review of Systems   Skin:  Positive for rash.        Vitals:    11/14/24 1626   BP: 118/81   Pulse: 82   Weight: 122 lb (55.3 kg)   Height: 4' 11\" (1.499 m)     Body mass index is 24.64 kg/m².    Physical Exam:   Physical Exam  Vitals reviewed.   Skin:     Findings: Rash present.      There is mild erythema macular on her right finger.    Assessment and Plan::     Problem List Items Addressed This Visit    None  Visit Diagnoses       Dermatitis    -  Primary    Relevant Medications    betamethasone dipropionate 0.05 % External Cream          Discussed plan of care with pt and pt is in agreement.All questions answered. Pt to call with questions or concerns.    RTC prn if symptom persists.       [1] No  Known Allergies

## 2024-11-15 ENCOUNTER — TELEPHONE (OUTPATIENT)
Dept: FAMILY MEDICINE CLINIC | Facility: CLINIC | Age: 24
End: 2024-11-15

## 2024-11-15 RX ORDER — CLOBETASOL PROPIONATE 0.5 MG/G
1 CREAM TOPICAL 2 TIMES DAILY
Qty: 30 G | Refills: 0 | Status: SHIPPED | OUTPATIENT
Start: 2024-11-15

## 2024-11-15 NOTE — TELEPHONE ENCOUNTER
Betamethasone 0.5% denied, patient to have tried and failed 5 preferred drugs: alclometazome .05% cream/ointment, betamethaonse valerate 0.1% cream/lotion/ointment, clobetasol .05% cream/gel/ointment/solution and additional listed below

## (undated) NOTE — LETTER
VACCINE ADMINISTRATION RECORD  PARENT / GUARDIAN APPROVAL  Date: 7/15/2024  Vaccine administered to: Nicky Mcleod     : 2000    MRN: [id number]    A copy of the appropriate Centers for Disease Control and Prevention Vaccine Information statement has been provided. I have read or have had explained the information about the diseases and the vaccines listed below. There was an opportunity to ask questions and any questions were answered satisfactorily. I believe that I understand the benefits and risks of the vaccine cited and ask that the vaccine(s) listed below be given to me or to the person named above (for whom I am authorized to make this request).    VACCINES ADMINISTERED:  Tdap    I have read and hereby agree to be bound by the terms of this agreement as stated above. My signature is valid until revoked by me in writing.  This document is signed by self, relationship: Self on 7/15/2024.:                                                                                                                                         Parent / Guardian Signature                                                Date    Kate FIELDS MA served as a witness to authentication that the identity of the person signing electronically is in fact the person represented as signing.    This document was generated by Kate FIELDS MA on 7/15/2024.

## (undated) NOTE — LETTER
AUTHORIZATION FOR SURGICAL OPERATION OR OTHER PROCEDURE    1. I hereby authorize Sarah AGUIRRE , and [facility] staff assigned to my case to perform the following operation and/or procedure at the [facility] site:    NEXPLANON INSERTION __________________________________________________________________________      _______________________________________________________________________________________________    2.  My physician has explained the nature and purpose of the operation or other procedure, possible alternative methods of treatment, the risks involved, and the possibility of complication to me.  I acknowledge that no guarantee has been made as to the result that may be obtained.  3.  I recognize that, during the course of this operation, or other procedure, unforseen conditions may necessitate additional or different procedure than those listed above.  I, therefore, further authorize and request that the above named physician, his/her physician assistants or designees perform such procedures as are, in his/her professional opinion, necessary and desirable.  4.  Any tissue or organs removed in the operation or other procedure may be disposed of by and at the discretion of the [facility] and [facility].  5.  I understand that in the event of a medical emergency, I will be transported by local paramedics to [facility] or other hospital emergency department.  6.  I certify that I have read and fully understand the above consent to operation and/or other procedure.    7.  I acknowledge that my physician has explained sedation/analgesia administration to me including the risks and benefits.  I consent to the administration of sedation/analgesia as may be necessary or desirable in the judgement of my physician.    Witness signature: ___________________________________________________ Date:  ______/______/_____                    Time:   ________ A.M.  P.M.       Patient Name:  ______________________________________________________  (please print)      Patient signature:  ___________________________________________________             Relationship to Patient:           []  Parent    Responsible person                          []  Spouse  In case of minor or                    [] Other  _____________   Incompetent name:  __________________________________________________                               (please print)      _____________      Responsible person  In case of minor or  Incompetent signature:  _______________________________________________    Statement of Physician  My signature below affirms that prior to the time of the procedure, I have explained to the patient and/or his/her guardian, the risks and benefits involved in the proposed treatment and any reasonable alternative to the proposed treatment.  I have also explained the risks and benefits involved in the refusal of the proposed treatment and have answered the patient's questions.                        Date:  ______/______/_______  Provider                      Signature:  __________________________________________________________       Time:  ___________ A.M    P.M.

## (undated) NOTE — LETTER
10/9/2019              Dede Bashir Mcleod        [address]        [place] [address]         Dear Clem Acuña,    This letter is to inform that we have tried to reach you by phone regarding an important pending test. It is advised for you to have a repeat blood test to follow up on your elevated liver enzymes as soon as possible to ensure there is not a worsening liver condition. The order is in the system and doesn't require an apt, but should be done fasting. Please contact our office with any question or concern. Thank you.              Sincerely,    Dr. Delicia Juárez  [id number]  [facility]  [place] [facility] [address] [phone]    Document electronically generated by:  Hoa Estrada   Certified letter ([id number]) [facility]

## (undated) NOTE — Clinical Note
IUP at [id number] fetal ultrasound & reassuring  testingRECOMMENDATIONS:Continue care with Ms. Keenan DEL ANGEL at 36 weeks

## (undated) NOTE — LETTER
[facility]  Administration of Anesthesia  Nicky MONTANO agree to be cared for by a physician anesthesiologist alone and/or with a nurse anesthetist, who is specially trained to monitor me and give me medicine to put me to sleep or keep me comfortable during my procedure    I understand that my anesthesiologist and/or anesthetist is not an employee or agent of [facility] or [employer]. He or she works for [facility]    As the patient asking for anesthesia services, I agree to:  Allow the anesthesiologist (anesthesia doctor) to give me medicine and do additional procedures as necessary. Some examples are: Starting or using an “IV” to give me medicine, fluids or blood during my procedure, and having a breathing tube placed to help me breathe when I’m asleep (intubation). In the event that my heart stops working properly, I understand that my anesthesiologist will make every effort to sustain my life, unless otherwise directed by [facility] Do Not Resuscitate documents.  Tell my anesthesia doctor before my procedure:  If I am pregnant.  The last time that I ate or drank.  iii. All of the medicines I take (including prescriptions, herbal supplements, and pills I can buy without a prescription (including street drugs/illegal medications). Failure to inform my anesthesiologist about these medicines may increase my risk of anesthetic complications.  iv.If I am allergic to anything or have had a reaction to anesthesia before.  I understand how the anesthesia medicine will help me (benefits).  I understand that with any type of anesthesia medicine there are risks:  The most common risks are: nausea, vomiting, sore throat, muscle soreness, damage to my eyes, mouth, or teeth (from breathing tube placement).  Rare risks include: remembering what happened during my procedure, allergic reactions to medications, injury to my airway, heart, lungs, vision, nerves,  or muscles and in extremely rare instances death.  My doctor has explained to me other choices available to me for my care (alternatives).  Pregnant Patients (“epidural”):  I understand that the risks of having an epidural (medicine given into my back to help control pain during labor), include itching, low blood pressure, difficulty urinating, headache or slowing of the baby’s heart. Very rare risks include infection, bleeding, seizure, irregular heart rhythms and nerve injury.  Regional Anesthesia (“spinal”, “epidural”, & “nerve blocks”):  I understand that rare but potential complications include headache, bleeding, infection, seizure, irregular heart rhythms, and nerve injury.    _____________________________________________________________________________  Patient (or Representative) Signature/Relationship to Patient  Date   Time    _____________________________________________________________________________   Name (if used)    Language/Organization   Time    _____________________________________________________________________________  Nurse Anesthetist Signature     Date   Time  _____________________________________________________________________________  Anesthesiologist Signature     Date   Time  I have discussed the procedure and information above with the patient (or patient’s representative) and answered their questions. The patient or their representative has agreed to have anesthesia services.    _____________________________________________________________________________  Witness        Date   Time  I have verified that the signature is that of the patient or patient’s representative, and that it was signed before the procedure  Patient Name: Nicky Mcleod     : 2000                 Printed: 2024 at 12:30 PM    Medical Record #: [id number]                                            Page 1 of 1  ----------ANESTHESIA CONSENT----------

## (undated) NOTE — LETTER
VACCINE ADMINISTRATION RECORD  PARENT / GUARDIAN APPROVAL  Date: 2019  Vaccine administered to: Del Mcleod     : 2000    MRN: [id number]    A copy of the appropriate Centers for Disease Control and Prevention Vaccine Information sta

## (undated) NOTE — LETTER
[facility]  Administration of Anesthesia  Nicky MONTANO agree to be cared for by a physician anesthesiologist alone and/or with a nurse anesthetist, who is specially trained to monitor me and give me medicine to put me to sleep or keep me comfortable during my procedure    I understand that my anesthesiologist and/or anesthetist is not an employee or agent of [facility] or [employer]. He or she works for [facility]    As the patient asking for anesthesia services, I agree to:  Allow the anesthesiologist (anesthesia doctor) to give me medicine and do additional procedures as necessary. Some examples are: Starting or using an “IV” to give me medicine, fluids or blood during my procedure, and having a breathing tube placed to help me breathe when I’m asleep (intubation). In the event that my heart stops working properly, I understand that my anesthesiologist will make every effort to sustain my life, unless otherwise directed by [facility] Do Not Resuscitate documents.  Tell my anesthesia doctor before my procedure:  If I am pregnant.  The last time that I ate or drank.  iii. All of the medicines I take (including prescriptions, herbal supplements, and pills I can buy without a prescription (including street drugs/illegal medications). Failure to inform my anesthesiologist about these medicines may increase my risk of anesthetic complications.  iv.If I am allergic to anything or have had a reaction to anesthesia before.  I understand how the anesthesia medicine will help me (benefits).  I understand that with any type of anesthesia medicine there are risks:  The most common risks are: nausea, vomiting, sore throat, muscle soreness, damage to my eyes, mouth, or teeth (from breathing tube placement).  Rare risks include: remembering what happened during my procedure, allergic reactions to medications, injury to my airway, heart, lungs, vision, nerves,  or muscles and in extremely rare instances death.  My doctor has explained to me other choices available to me for my care (alternatives).  Pregnant Patients (“epidural”):  I understand that the risks of having an epidural (medicine given into my back to help control pain during labor), include itching, low blood pressure, difficulty urinating, headache or slowing of the baby’s heart. Very rare risks include infection, bleeding, seizure, irregular heart rhythms and nerve injury.  Regional Anesthesia (“spinal”, “epidural”, & “nerve blocks”):  I understand that rare but potential complications include headache, bleeding, infection, seizure, irregular heart rhythms, and nerve injury.    _____________________________________________________________________________  Patient (or Representative) Signature/Relationship to Patient  Date   Time    _____________________________________________________________________________   Name (if used)    Language/Organization   Time    _____________________________________________________________________________  Nurse Anesthetist Signature     Date   Time  _____________________________________________________________________________  Anesthesiologist Signature     Date   Time  I have discussed the procedure and information above with the patient (or patient’s representative) and answered their questions. The patient or their representative has agreed to have anesthesia services.    _____________________________________________________________________________  Witness        Date   Time  I have verified that the signature is that of the patient or patient’s representative, and that it was signed before the procedure  Patient Name: Nicky Mcleod     : 2000                 Printed: 2024 at 12:29 PM    Medical Record #: [id number]                                            Page 1 of 1  ----------ANESTHESIA CONSENT----------